# Patient Record
Sex: FEMALE | Race: ASIAN | NOT HISPANIC OR LATINO
[De-identification: names, ages, dates, MRNs, and addresses within clinical notes are randomized per-mention and may not be internally consistent; named-entity substitution may affect disease eponyms.]

---

## 2017-07-12 ENCOUNTER — APPOINTMENT (OUTPATIENT)
Dept: UROLOGY | Facility: CLINIC | Age: 77
End: 2017-07-12

## 2017-07-14 ENCOUNTER — APPOINTMENT (OUTPATIENT)
Dept: UROLOGY | Facility: CLINIC | Age: 77
End: 2017-07-14

## 2017-07-19 ENCOUNTER — APPOINTMENT (OUTPATIENT)
Dept: UROLOGY | Facility: CLINIC | Age: 77
End: 2017-07-19

## 2018-03-03 ENCOUNTER — EMERGENCY (EMERGENCY)
Facility: HOSPITAL | Age: 78
LOS: 1 days | Discharge: HOME | End: 2018-03-03
Attending: EMERGENCY MEDICINE | Admitting: EMERGENCY MEDICINE
Payer: MEDICARE

## 2018-03-03 VITALS
DIASTOLIC BLOOD PRESSURE: 86 MMHG | WEIGHT: 134.92 LBS | SYSTOLIC BLOOD PRESSURE: 184 MMHG | HEIGHT: 63 IN | OXYGEN SATURATION: 98 % | RESPIRATION RATE: 18 BRPM | HEART RATE: 88 BPM | TEMPERATURE: 99 F

## 2018-03-03 PROCEDURE — 99283 EMERGENCY DEPT VISIT LOW MDM: CPT | Mod: 25,GC

## 2018-03-03 NOTE — ED ADULT TRIAGE NOTE - CHIEF COMPLAINT QUOTE
right knee pain    right knee pain for a couple of weeks.... saw pmd and told had arthritis and fluid on the knee... meds helping initially but pain worsened yesterday. has pain to back of knee.

## 2018-03-04 PROCEDURE — 73562 X-RAY EXAM OF KNEE 3: CPT | Mod: 26,RT

## 2018-03-04 RX ORDER — ACETAMINOPHEN 500 MG
954 TABLET ORAL ONCE
Qty: 0 | Refills: 0 | Status: DISCONTINUED | OUTPATIENT
Start: 2018-03-04 | End: 2018-03-04

## 2018-03-04 RX ORDER — FAMOTIDINE 10 MG/ML
20 INJECTION INTRAVENOUS ONCE
Qty: 0 | Refills: 0 | Status: COMPLETED | OUTPATIENT
Start: 2018-03-04 | End: 2018-03-04

## 2018-03-04 RX ORDER — IBUPROFEN 200 MG
600 TABLET ORAL ONCE
Qty: 0 | Refills: 0 | Status: COMPLETED | OUTPATIENT
Start: 2018-03-04 | End: 2018-03-04

## 2018-03-04 RX ORDER — ACETAMINOPHEN 500 MG
975 TABLET ORAL ONCE
Qty: 0 | Refills: 0 | Status: COMPLETED | OUTPATIENT
Start: 2018-03-04 | End: 2018-03-04

## 2018-03-04 RX ADMIN — Medication 975 MILLIGRAM(S): at 02:59

## 2018-03-04 RX ADMIN — Medication 600 MILLIGRAM(S): at 02:59

## 2018-03-04 RX ADMIN — FAMOTIDINE 20 MILLIGRAM(S): 10 INJECTION INTRAVENOUS at 02:59

## 2018-03-04 NOTE — ED PROVIDER NOTE - CARE PLAN
Principal Discharge DX:	Chronic pain of right knee Principal Discharge DX:	Chronic pain of right knee  Assessment and plan of treatment:	You were seen in the ER for right knee pain. You must follow up with an orthopedic specialist this week. Return to the ER for any new or worsening signs/symptoms.   1) Take Tylenol (acetaminophen) 1000 mg every 6 to 8 hours as needed for pain with a daily maximal dose of 3000 mg.  2) Take ibuprofen, 600 mg,  every 6 to 8 hours as needed for pain with a maximum daily dose of 1800 mg.  3) You may take 20mg of Pepcid twice per day for indigestion relief. This is an over the counter medication available at normal pharmacies.

## 2018-03-04 NOTE — ED PROVIDER NOTE - MEDICAL DECISION MAKING DETAILS
Fab: 77 F, ? arthritis R knee, no trauma/F. R knee mildly swollen; not red/hot. Has FROM. AFVSS. Moderate HTN. Will get weight-bearing imaging. Pain control. Eval for gout. Fab: 77 F, OA R knee, no trauma/F. Walked more than usual. P/w R knee pain. R knee mildly swollen; not red/hot. Limited FROM. AFVSS. Moderate HTN. Likely meniscal tear after OA and exercise. Pain control. Ortho f/u.

## 2018-03-04 NOTE — ED ADULT NURSE NOTE - OBJECTIVE STATEMENT
Break coverage RN received pt to spot 20 A&Ox4 family at bedside c/o worsening arthritic pain in right knee, newly dx approx 4 weeks ago given meloxicam with relief but today pain became unbearable. Not able to weight bear at this time, denies other PMH denies other medical complaints, NAD noted, will continue to monitor.

## 2018-03-04 NOTE — ED PROVIDER NOTE - OBJECTIVE STATEMENT
77 female history of right knee OA here for right knee pain. Onset today after increased PA (walking up hills, walked more then usual), then knee locked, unable to place weight on it. No fevers/chills, no increase swelling. Pain localized to knee, nonradiating, severe when steps on it, better when not walking. Has never been this severe.

## 2018-03-04 NOTE — ED PROVIDER NOTE - NS ED ROS FT
GENERAL: No fever or chills, //             EYES: no change in vision, //             HEENT: no trouble swallowing or speaking, //             CARDIAC: no chest pain, //              PULMONARY: no cough or SOB, //             GI: no abdominal pain, no nausea or no vomiting, no diarrhea or constipation, //             : No changes in urination,  //            SKIN: no rashes,  //            NEURO: no headache,  //         otherwise as HPI or negative. ~Ligia Pan M.D., Ph.D. -Resident

## 2018-03-04 NOTE — ED PROVIDER NOTE - PLAN OF CARE
You were seen in the ER for right knee pain. You must follow up with an orthopedic specialist this week. Return to the ER for any new or worsening signs/symptoms.   1) Take Tylenol (acetaminophen) 1000 mg every 6 to 8 hours as needed for pain with a daily maximal dose of 3000 mg.  2) Take ibuprofen, 600 mg,  every 6 to 8 hours as needed for pain with a maximum daily dose of 1800 mg.  3) You may take 20mg of Pepcid twice per day for indigestion relief. This is an over the counter medication available at normal pharmacies.

## 2018-03-04 NOTE — ED PROVIDER NOTE - PHYSICAL EXAMINATION
Gen: NAD, AOx3, non-toxic //            Head: NCAT //            HEENT: EOMI, oral mucosa moist, normal conjunctiva //            Lung: CTAB, no respiratory distress, no wheezes/rhonchi/rales B/L, speaking in full sentences. //            CV: RRR, no murmurs, rubs or gallops //            Abd: soft, NTND, no guarding, no CVA tenderness //            MSK: right knee tenderness to palpation, ROM limited by pain, unable to ambulate with full weight on right leg. No overlying erythema, mild effusion. No warmth. Otherwise MSK exam WINL.  //            Neuro: No focal sensory or motor deficits //            Skin: Warm, well perfused, no rash //            Psych: normal affect. ~Ligia Pan M.D., Ph.D. -Resident

## 2018-03-04 NOTE — ED PROVIDER NOTE - ATTENDING CONTRIBUTION TO CARE
I performed a face-to-face evaluation of the patient and performed a history and physical examination. I agree with the history and physical examination.    Fab: 77 F, ? arthritis R knee, no trauma/F. R knee mildly swollen; not red/hot. Has FROM. AFVSS. Moderate HTN. Will get weight-bearing imaging. Pain control. Eval for gout. I performed a face-to-face evaluation of the patient and performed a history and physical examination. I agree with the history and physical examination.    Fab: 77 F, OA R knee, no trauma/F. Walked more than usual. P/w R knee pain. R knee mildly swollen; not red/hot. Limited FROM. AFVSS. Moderate HTN. Likely meniscal tear after OA and exercise. Pain control. Ortho f/u.

## 2018-03-06 ENCOUNTER — APPOINTMENT (OUTPATIENT)
Dept: ORTHOPEDIC SURGERY | Facility: CLINIC | Age: 78
End: 2018-03-06
Payer: MEDICARE

## 2018-03-06 VITALS — HEART RATE: 80 BPM | DIASTOLIC BLOOD PRESSURE: 73 MMHG | SYSTOLIC BLOOD PRESSURE: 180 MMHG

## 2018-03-06 VITALS — WEIGHT: 135 LBS | BODY MASS INDEX: 23.92 KG/M2 | HEIGHT: 63 IN

## 2018-03-06 DIAGNOSIS — Z86.39 PERSONAL HISTORY OF OTHER ENDOCRINE, NUTRITIONAL AND METABOLIC DISEASE: ICD-10-CM

## 2018-03-06 DIAGNOSIS — Z78.9 OTHER SPECIFIED HEALTH STATUS: ICD-10-CM

## 2018-03-06 PROCEDURE — 99204 OFFICE O/P NEW MOD 45 MIN: CPT

## 2018-03-15 ENCOUNTER — APPOINTMENT (OUTPATIENT)
Dept: ORTHOPEDIC SURGERY | Facility: CLINIC | Age: 78
End: 2018-03-15
Payer: MEDICARE

## 2018-03-15 VITALS
WEIGHT: 135 LBS | BODY MASS INDEX: 23.92 KG/M2 | SYSTOLIC BLOOD PRESSURE: 214 MMHG | HEIGHT: 63 IN | DIASTOLIC BLOOD PRESSURE: 78 MMHG | HEART RATE: 92 BPM

## 2018-03-15 PROCEDURE — 99213 OFFICE O/P EST LOW 20 MIN: CPT | Mod: 25

## 2018-03-15 PROCEDURE — 20610 DRAIN/INJ JOINT/BURSA W/O US: CPT | Mod: RT

## 2018-03-22 ENCOUNTER — RX RENEWAL (OUTPATIENT)
Age: 78
End: 2018-03-22

## 2018-03-29 ENCOUNTER — APPOINTMENT (OUTPATIENT)
Dept: ORTHOPEDIC SURGERY | Facility: CLINIC | Age: 78
End: 2018-03-29
Payer: MEDICARE

## 2018-03-29 VITALS
HEIGHT: 63 IN | BODY MASS INDEX: 23.92 KG/M2 | WEIGHT: 135 LBS | SYSTOLIC BLOOD PRESSURE: 192 MMHG | HEART RATE: 86 BPM | DIASTOLIC BLOOD PRESSURE: 75 MMHG

## 2018-03-29 PROCEDURE — 99213 OFFICE O/P EST LOW 20 MIN: CPT

## 2018-03-29 PROCEDURE — 73562 X-RAY EXAM OF KNEE 3: CPT | Mod: RT

## 2018-04-02 ENCOUNTER — APPOINTMENT (OUTPATIENT)
Dept: ORTHOPEDIC SURGERY | Facility: CLINIC | Age: 78
End: 2018-04-02
Payer: MEDICARE

## 2018-04-02 VITALS
WEIGHT: 135 LBS | HEART RATE: 73 BPM | HEIGHT: 63 IN | DIASTOLIC BLOOD PRESSURE: 71 MMHG | SYSTOLIC BLOOD PRESSURE: 196 MMHG | BODY MASS INDEX: 23.92 KG/M2

## 2018-04-02 PROCEDURE — 20610 DRAIN/INJ JOINT/BURSA W/O US: CPT | Mod: RT

## 2018-04-09 ENCOUNTER — APPOINTMENT (OUTPATIENT)
Dept: ORTHOPEDIC SURGERY | Facility: CLINIC | Age: 78
End: 2018-04-09
Payer: MEDICARE

## 2018-04-09 VITALS
HEART RATE: 96 BPM | DIASTOLIC BLOOD PRESSURE: 75 MMHG | SYSTOLIC BLOOD PRESSURE: 172 MMHG | BODY MASS INDEX: 23.92 KG/M2 | HEIGHT: 63 IN | WEIGHT: 135 LBS

## 2018-04-09 PROCEDURE — 20610 DRAIN/INJ JOINT/BURSA W/O US: CPT | Mod: RT

## 2018-04-16 ENCOUNTER — APPOINTMENT (OUTPATIENT)
Dept: ORTHOPEDIC SURGERY | Facility: CLINIC | Age: 78
End: 2018-04-16
Payer: MEDICARE

## 2018-04-16 VITALS
HEART RATE: 88 BPM | DIASTOLIC BLOOD PRESSURE: 73 MMHG | SYSTOLIC BLOOD PRESSURE: 147 MMHG | BODY MASS INDEX: 51.91 KG/M2 | HEIGHT: 63 IN | WEIGHT: 293 LBS

## 2018-04-16 PROCEDURE — 20610 DRAIN/INJ JOINT/BURSA W/O US: CPT | Mod: RT

## 2018-05-15 ENCOUNTER — APPOINTMENT (OUTPATIENT)
Dept: ORTHOPEDIC SURGERY | Facility: CLINIC | Age: 78
End: 2018-05-15

## 2018-05-22 ENCOUNTER — APPOINTMENT (OUTPATIENT)
Dept: ORTHOPEDIC SURGERY | Facility: CLINIC | Age: 78
End: 2018-05-22
Payer: MEDICARE

## 2018-05-22 VITALS
HEART RATE: 87 BPM | SYSTOLIC BLOOD PRESSURE: 184 MMHG | DIASTOLIC BLOOD PRESSURE: 71 MMHG | BODY MASS INDEX: 23.92 KG/M2 | HEIGHT: 63 IN | WEIGHT: 135 LBS

## 2018-05-22 PROCEDURE — 20610 DRAIN/INJ JOINT/BURSA W/O US: CPT | Mod: RT

## 2018-05-22 PROCEDURE — 99213 OFFICE O/P EST LOW 20 MIN: CPT | Mod: 25

## 2018-07-05 ENCOUNTER — APPOINTMENT (OUTPATIENT)
Dept: ORTHOPEDIC SURGERY | Facility: CLINIC | Age: 78
End: 2018-07-05
Payer: MEDICARE

## 2018-07-05 VITALS
HEIGHT: 63 IN | WEIGHT: 135 LBS | HEART RATE: 87 BPM | SYSTOLIC BLOOD PRESSURE: 147 MMHG | BODY MASS INDEX: 23.92 KG/M2 | DIASTOLIC BLOOD PRESSURE: 61 MMHG

## 2018-07-05 PROCEDURE — 73564 X-RAY EXAM KNEE 4 OR MORE: CPT | Mod: LT

## 2018-07-05 PROCEDURE — 99213 OFFICE O/P EST LOW 20 MIN: CPT

## 2018-07-24 ENCOUNTER — APPOINTMENT (OUTPATIENT)
Dept: ORTHOPEDIC SURGERY | Facility: CLINIC | Age: 78
End: 2018-07-24
Payer: MEDICARE

## 2018-07-24 VITALS
WEIGHT: 135 LBS | DIASTOLIC BLOOD PRESSURE: 67 MMHG | HEART RATE: 79 BPM | HEIGHT: 63 IN | SYSTOLIC BLOOD PRESSURE: 168 MMHG | BODY MASS INDEX: 23.92 KG/M2

## 2018-07-24 PROCEDURE — 99213 OFFICE O/P EST LOW 20 MIN: CPT

## 2018-08-09 ENCOUNTER — APPOINTMENT (OUTPATIENT)
Dept: ORTHOPEDIC SURGERY | Facility: CLINIC | Age: 78
End: 2018-08-09
Payer: MEDICARE

## 2018-08-09 VITALS
SYSTOLIC BLOOD PRESSURE: 143 MMHG | HEIGHT: 63 IN | HEART RATE: 76 BPM | DIASTOLIC BLOOD PRESSURE: 63 MMHG | WEIGHT: 135 LBS | BODY MASS INDEX: 23.92 KG/M2

## 2018-08-09 PROCEDURE — 99213 OFFICE O/P EST LOW 20 MIN: CPT

## 2018-08-09 PROCEDURE — 73610 X-RAY EXAM OF ANKLE: CPT | Mod: LT

## 2018-09-04 ENCOUNTER — APPOINTMENT (OUTPATIENT)
Dept: ORTHOPEDIC SURGERY | Facility: CLINIC | Age: 78
End: 2018-09-04
Payer: MEDICARE

## 2018-09-04 VITALS
SYSTOLIC BLOOD PRESSURE: 111 MMHG | HEIGHT: 63 IN | WEIGHT: 136 LBS | DIASTOLIC BLOOD PRESSURE: 67 MMHG | HEART RATE: 87 BPM | BODY MASS INDEX: 24.1 KG/M2

## 2018-09-04 DIAGNOSIS — M25.472 EFFUSION, LEFT ANKLE: ICD-10-CM

## 2018-09-04 PROCEDURE — 99213 OFFICE O/P EST LOW 20 MIN: CPT

## 2018-11-20 ENCOUNTER — APPOINTMENT (OUTPATIENT)
Dept: INTERNAL MEDICINE | Facility: CLINIC | Age: 78
End: 2018-11-20
Payer: MEDICARE

## 2018-11-20 ENCOUNTER — LABORATORY RESULT (OUTPATIENT)
Age: 78
End: 2018-11-20

## 2018-11-20 VITALS — DIASTOLIC BLOOD PRESSURE: 76 MMHG | SYSTOLIC BLOOD PRESSURE: 150 MMHG

## 2018-11-20 VITALS
BODY MASS INDEX: 24.8 KG/M2 | WEIGHT: 140 LBS | DIASTOLIC BLOOD PRESSURE: 60 MMHG | SYSTOLIC BLOOD PRESSURE: 130 MMHG | HEIGHT: 63 IN

## 2018-11-20 DIAGNOSIS — Z82.49 FAMILY HISTORY OF ISCHEMIC HEART DISEASE AND OTHER DISEASES OF THE CIRCULATORY SYSTEM: ICD-10-CM

## 2018-11-20 PROCEDURE — 36415 COLL VENOUS BLD VENIPUNCTURE: CPT

## 2018-11-20 PROCEDURE — 99203 OFFICE O/P NEW LOW 30 MIN: CPT | Mod: 25

## 2018-11-20 RX ORDER — TRAMADOL HYDROCHLORIDE 25 MG/1
TABLET, COATED ORAL
Refills: 0 | Status: DISCONTINUED | COMMUNITY
End: 2018-11-20

## 2018-11-20 RX ORDER — MELOXICAM 15 MG/1
15 TABLET ORAL
Qty: 30 | Refills: 0 | Status: DISCONTINUED | COMMUNITY
Start: 2018-01-16 | End: 2018-11-20

## 2018-11-20 RX ORDER — LIDOCAINE 5 G/100G
5 OINTMENT TOPICAL
Qty: 3544 | Refills: 0 | Status: DISCONTINUED | COMMUNITY
Start: 2018-01-16 | End: 2018-11-20

## 2018-11-20 RX ORDER — IBUPROFEN 200 MG/1
TABLET, FILM COATED ORAL
Refills: 0 | Status: DISCONTINUED | COMMUNITY
End: 2018-11-20

## 2018-11-20 RX ORDER — ACETAMINOPHEN 325 MG/1
TABLET, FILM COATED ORAL
Refills: 0 | Status: ACTIVE | COMMUNITY

## 2018-11-20 RX ORDER — MELOXICAM 15 MG/1
15 TABLET ORAL DAILY
Qty: 30 | Refills: 0 | Status: DISCONTINUED | COMMUNITY
Start: 2018-03-22 | End: 2018-11-20

## 2018-11-20 NOTE — COUNSELING
[Healthy eating counseling provided] : healthy eating [Activity counseling provided] : activity [Low Fat Diet] : Low fat diet [Low Salt Diet] : Low salt diet

## 2018-11-20 NOTE — HISTORY OF PRESENT ILLNESS
[FreeTextEntry8] : CC:Htn\par \par HTN- compliant w meds, diet.  no CP or SOB.  pt states home /70"s\par \par CHol- compliant w meds, diet.  no abd pain, myalgia\par pt had flu vaccine.  pt thinks she had pneum vaccine.  pt states she didn't  get shingrix vaccine- due to cost\par knee pain- getting shots by Ortho- takes tylenol for pain.  pt goes to the [facility].  pt getting jt injections- now improved jt pain\par \par pt was seen GYN- last mo and has appt for mammo\par pt had colonoscopy\par DEXA- goes every 2 yr.\par last seen ophth every few mo- glaucoma

## 2018-11-20 NOTE — PLAN
[FreeTextEntry1] : rec shingrix vaccine\par HTN- elev BP- pt will ck home BP.  low salt diet\par pt to obtain records of vaccine, mammo, colonoscopy and ck last CPE

## 2018-11-21 LAB
ALBUMIN SERPL ELPH-MCNC: 4.8 G/DL
ALP BLD-CCNC: 53 U/L
ALT SERPL-CCNC: 11 U/L
ANION GAP SERPL CALC-SCNC: 14 MMOL/L
AST SERPL-CCNC: 18 U/L
BASOPHILS # BLD AUTO: 0.04 K/UL
BASOPHILS NFR BLD AUTO: 0.6 %
BILIRUB SERPL-MCNC: 0.7 MG/DL
BUN SERPL-MCNC: 21 MG/DL
CALCIUM SERPL-MCNC: 9.6 MG/DL
CHLORIDE SERPL-SCNC: 109 MMOL/L
CHOLEST SERPL-MCNC: 161 MG/DL
CHOLEST/HDLC SERPL: 4.1 RATIO
CO2 SERPL-SCNC: 24 MMOL/L
CREAT SERPL-MCNC: 0.81 MG/DL
EOSINOPHIL # BLD AUTO: 0.29 K/UL
EOSINOPHIL NFR BLD AUTO: 4.3 %
GLUCOSE SERPL-MCNC: 86 MG/DL
HBA1C MFR BLD HPLC: 5.2 %
HCT VFR BLD CALC: 36.5 %
HDLC SERPL-MCNC: 39 MG/DL
HGB BLD-MCNC: 11.5 G/DL
IMM GRANULOCYTES NFR BLD AUTO: 0.1 %
LDLC SERPL CALC-MCNC: 90 MG/DL
LDLC SERPL DIRECT ASSAY-MCNC: 98 MG/DL
LYMPHOCYTES # BLD AUTO: 2.19 K/UL
LYMPHOCYTES NFR BLD AUTO: 32.3 %
MAN DIFF?: NORMAL
MCHC RBC-ENTMCNC: 28.2 PG
MCHC RBC-ENTMCNC: 31.5 GM/DL
MCV RBC AUTO: 89.5 FL
MONOCYTES # BLD AUTO: 0.51 K/UL
MONOCYTES NFR BLD AUTO: 7.5 %
NEUTROPHILS # BLD AUTO: 3.75 K/UL
NEUTROPHILS NFR BLD AUTO: 55.2 %
PLATELET # BLD AUTO: 254 K/UL
POTASSIUM SERPL-SCNC: 3.8 MMOL/L
PROT SERPL-MCNC: 7.8 G/DL
RBC # BLD: 4.08 M/UL
RBC # FLD: 13 %
SAVE SPECIMEN: NORMAL
SODIUM SERPL-SCNC: 147 MMOL/L
TRIGL SERPL-MCNC: 161 MG/DL
TSH SERPL-ACNC: 2.18 UIU/ML
WBC # FLD AUTO: 6.79 K/UL

## 2018-11-28 ENCOUNTER — RX RENEWAL (OUTPATIENT)
Age: 78
End: 2018-11-28

## 2018-12-04 ENCOUNTER — APPOINTMENT (OUTPATIENT)
Dept: ORTHOPEDIC SURGERY | Facility: CLINIC | Age: 78
End: 2018-12-04
Payer: MEDICARE

## 2018-12-04 VITALS
SYSTOLIC BLOOD PRESSURE: 147 MMHG | WEIGHT: 140 LBS | HEIGHT: 63 IN | HEART RATE: 75 BPM | BODY MASS INDEX: 24.8 KG/M2 | DIASTOLIC BLOOD PRESSURE: 66 MMHG

## 2018-12-04 DIAGNOSIS — M17.11 UNILATERAL PRIMARY OSTEOARTHRITIS, RIGHT KNEE: ICD-10-CM

## 2018-12-04 PROCEDURE — 99213 OFFICE O/P EST LOW 20 MIN: CPT

## 2019-01-08 ENCOUNTER — RX RENEWAL (OUTPATIENT)
Age: 79
End: 2019-01-08

## 2019-02-28 ENCOUNTER — RX RENEWAL (OUTPATIENT)
Age: 79
End: 2019-02-28

## 2019-03-19 ENCOUNTER — LABORATORY RESULT (OUTPATIENT)
Age: 79
End: 2019-03-19

## 2019-03-19 ENCOUNTER — APPOINTMENT (OUTPATIENT)
Dept: INTERNAL MEDICINE | Facility: CLINIC | Age: 79
End: 2019-03-19
Payer: MEDICARE

## 2019-03-19 VITALS
HEIGHT: 63 IN | HEART RATE: 80 BPM | DIASTOLIC BLOOD PRESSURE: 74 MMHG | OXYGEN SATURATION: 98 % | TEMPERATURE: 97.2 F | RESPIRATION RATE: 16 BRPM | BODY MASS INDEX: 24.1 KG/M2 | SYSTOLIC BLOOD PRESSURE: 132 MMHG | WEIGHT: 136 LBS

## 2019-03-19 DIAGNOSIS — Z80.41 FAMILY HISTORY OF MALIGNANT NEOPLASM OF OVARY: ICD-10-CM

## 2019-03-19 DIAGNOSIS — Z80.1 FAMILY HISTORY OF MALIGNANT NEOPLASM OF TRACHEA, BRONCHUS AND LUNG: ICD-10-CM

## 2019-03-19 DIAGNOSIS — H40.9 UNSPECIFIED GLAUCOMA: ICD-10-CM

## 2019-03-19 PROCEDURE — 93000 ELECTROCARDIOGRAM COMPLETE: CPT

## 2019-03-19 PROCEDURE — G0439: CPT | Mod: 25

## 2019-03-19 PROCEDURE — 94010 BREATHING CAPACITY TEST: CPT

## 2019-03-19 PROCEDURE — 71046 X-RAY EXAM CHEST 2 VIEWS: CPT

## 2019-03-19 PROCEDURE — 99205 OFFICE O/P NEW HI 60 MIN: CPT | Mod: 25

## 2019-03-19 PROCEDURE — 36415 COLL VENOUS BLD VENIPUNCTURE: CPT

## 2019-03-19 NOTE — ASSESSMENT
[FreeTextEntry1] : Hypertension is well controlled.\par \par Blood work and urinalysis results are pending. Same Rx for now.\par \par Recheck blood pressure in 4 months.

## 2019-03-19 NOTE — HEALTH RISK ASSESSMENT
[Excellent] : ~his/her~ current health as excellent [Patient reported mammogram was normal] : Patient reported mammogram was normal [Patient reported colonoscopy was normal] : Patient reported colonoscopy was normal [HIV test declined] : HIV test declined [Hepatitis C test offered] : Hepatitis C test offered [None] : None [Alone] : lives alone [Employed] : employed [] :  [Feels Safe at Home] : Feels safe at home [Fully functional (bathing, dressing, toileting, transferring, walking, feeding)] : Fully functional (bathing, dressing, toileting, transferring, walking, feeding) [Fully functional (using the telephone, shopping, preparing meals, housekeeping, doing laundry, using] : Fully functional and needs no help or supervision to perform IADLs (using the telephone, shopping, preparing meals, housekeeping, doing laundry, using transportation, managing medications and managing finances) [Reports normal functional visual acuity (ie: able to read med bottle)] : Reports normal functional visual acuity [Smoke Detector] : smoke detector [Carbon Monoxide Detector] : carbon monoxide detector [Seat Belt] :  uses seat belt [Sunscreen] : uses sunscreen [FreeTextEntry1] : Breast implants, glaucoma, osteoarthritis of the right knee [] : No [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, ophthalmology [de-identified] : Limited, but active at work [de-identified] : Good [Change in mental status noted] : No change in mental status noted [Language] : denies difficulty with language [Behavior] : denies difficulty with behavior [Learning/Retaining New Information] : denies difficulty learning/retaining new information [Handling Complex Tasks] : denies difficulty handling complex tasks [Reasoning] : denies difficulty with reasoning [Spatial Ability and Orientation] : denies difficulty with spatial ability and orientation [High Risk Behavior] : no high risk behavior [Sexually Active] : not sexually active [Reports changes in dental health] : Reports no changes in dental health [Reports changes in vision] : Reports no changes in vision [Travel to Developing Areas] : does not  travel to developing areas [Guns at Home] : no guns at home [TB Exposure] : is not being exposed to tuberculosis [MammogramDate] : 3/19 [Caregiver Concerns] : does not have caregiver concerns [HepatitisCComments] : In progress [ColonoscopyDate] : 9/12 [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

## 2019-03-19 NOTE — DATA REVIEWED
[FreeTextEntry1] : Electrocardiogram reveals a sinus rhythm with first degree AV block. Spirometry is normal. Pulse oximetry is 98%.\par \par Chest x-ray reveals clear lung fields, normal heart size and a normal bony thorax for age-no active pulmonary disease.\par \par Blood work and urinalysis results are pending.

## 2019-03-19 NOTE — HISTORY OF PRESENT ILLNESS
[FreeTextEntry1] : Complete annual examination [de-identified] : Feels well. She is very anxious about bilateral saline implants which have been present and not problematic for more than a decade.

## 2019-04-05 ENCOUNTER — APPOINTMENT (OUTPATIENT)
Dept: INTERNAL MEDICINE | Facility: CLINIC | Age: 79
End: 2019-04-05
Payer: MEDICARE

## 2019-04-05 VITALS
WEIGHT: 137 LBS | HEART RATE: 87 BPM | SYSTOLIC BLOOD PRESSURE: 135 MMHG | HEIGHT: 63 IN | OXYGEN SATURATION: 98 % | BODY MASS INDEX: 24.27 KG/M2 | DIASTOLIC BLOOD PRESSURE: 80 MMHG | TEMPERATURE: 97.9 F

## 2019-04-05 DIAGNOSIS — H57.89 OTHER SPECIFIED DISORDERS OF EYE AND ADNEXA: ICD-10-CM

## 2019-04-05 DIAGNOSIS — H10.11 ACUTE ATOPIC CONJUNCTIVITIS, RIGHT EYE: ICD-10-CM

## 2019-04-05 LAB
ALBUMIN SERPL ELPH-MCNC: 4.5 G/DL
ALP BLD-CCNC: 57 U/L
ALT SERPL-CCNC: 13 U/L
ANION GAP SERPL CALC-SCNC: 17 MMOL/L
APPEARANCE: ABNORMAL
AST SERPL-CCNC: 18 U/L
BASOPHILS # BLD AUTO: 0.06 K/UL
BASOPHILS NFR BLD AUTO: 0.7 %
BILIRUB SERPL-MCNC: 0.7 MG/DL
BILIRUBIN URINE: NEGATIVE
BLOOD URINE: NEGATIVE
BUN SERPL-MCNC: 25 MG/DL
CALCIUM SERPL-MCNC: 10 MG/DL
CHLORIDE SERPL-SCNC: 102 MMOL/L
CHOLEST SERPL-MCNC: 171 MG/DL
CHOLEST/HDLC SERPL: 4.3 RATIO
CO2 SERPL-SCNC: 24 MMOL/L
COLOR: NORMAL
CREAT SERPL-MCNC: 0.84 MG/DL
EOSINOPHIL # BLD AUTO: 0.23 K/UL
EOSINOPHIL NFR BLD AUTO: 2.8 %
GLUCOSE QUALITATIVE U: NEGATIVE
GLUCOSE SERPL-MCNC: 111 MG/DL
HBA1C MFR BLD HPLC: 5.1 %
HCT VFR BLD CALC: 42.3 %
HCV RNA FLD QL NAA+PROBE: NORMAL
HCV RNA SPEC QL PROBE+SIG AMP: NOT DETECTED
HDLC SERPL-MCNC: 40 MG/DL
HGB BLD-MCNC: 12.9 G/DL
IMM GRANULOCYTES NFR BLD AUTO: 0.2 %
KETONES URINE: NEGATIVE
LDLC SERPL CALC-MCNC: 96 MG/DL
LEUKOCYTE ESTERASE URINE: NEGATIVE
LYMPHOCYTES # BLD AUTO: 1.41 K/UL
LYMPHOCYTES NFR BLD AUTO: 17 %
MAN DIFF?: NORMAL
MCHC RBC-ENTMCNC: 29.2 PG
MCHC RBC-ENTMCNC: 30.5 GM/DL
MCV RBC AUTO: 95.7 FL
MONOCYTES # BLD AUTO: 0.44 K/UL
MONOCYTES NFR BLD AUTO: 5.3 %
NEUTROPHILS # BLD AUTO: 6.14 K/UL
NEUTROPHILS NFR BLD AUTO: 74 %
NITRITE URINE: NEGATIVE
PH URINE: 6
PLATELET # BLD AUTO: 255 K/UL
POTASSIUM SERPL-SCNC: 3.8 MMOL/L
PROT SERPL-MCNC: 7.7 G/DL
PROTEIN URINE: NEGATIVE
RBC # BLD: 4.42 M/UL
RBC # FLD: 12.5 %
SODIUM SERPL-SCNC: 142 MMOL/L
SPECIFIC GRAVITY URINE: 1.02
T3 SERPL-MCNC: 92 NG/DL
T4 SERPL-MCNC: 5.7 UG/DL
TRIGL SERPL-MCNC: 175 MG/DL
TSH SERPL-ACNC: 2.32 UIU/ML
UROBILINOGEN URINE: NORMAL
WBC # FLD AUTO: 8.3 K/UL

## 2019-04-05 PROCEDURE — 99214 OFFICE O/P EST MOD 30 MIN: CPT

## 2019-04-18 ENCOUNTER — RX RENEWAL (OUTPATIENT)
Age: 79
End: 2019-04-18

## 2019-05-23 ENCOUNTER — APPOINTMENT (OUTPATIENT)
Dept: INTERNAL MEDICINE | Facility: CLINIC | Age: 79
End: 2019-05-23
Payer: MEDICARE

## 2019-05-23 VITALS
SYSTOLIC BLOOD PRESSURE: 138 MMHG | WEIGHT: 139 LBS | HEIGHT: 63 IN | DIASTOLIC BLOOD PRESSURE: 72 MMHG | OXYGEN SATURATION: 97 % | HEART RATE: 92 BPM | TEMPERATURE: 99 F | BODY MASS INDEX: 24.63 KG/M2

## 2019-05-23 DIAGNOSIS — Z87.09 PERSONAL HISTORY OF OTHER DISEASES OF THE RESPIRATORY SYSTEM: ICD-10-CM

## 2019-05-23 PROCEDURE — 99213 OFFICE O/P EST LOW 20 MIN: CPT | Mod: 25

## 2019-05-23 PROCEDURE — 87880 STREP A ASSAY W/OPTIC: CPT | Mod: QW

## 2019-05-30 LAB — BACTERIA THROAT CULT: NORMAL

## 2019-06-11 ENCOUNTER — MEDICATION RENEWAL (OUTPATIENT)
Age: 79
End: 2019-06-11

## 2019-06-13 ENCOUNTER — APPOINTMENT (OUTPATIENT)
Dept: ORTHOPEDIC SURGERY | Facility: CLINIC | Age: 79
End: 2019-06-13
Payer: MEDICARE

## 2019-06-13 VITALS
WEIGHT: 139 LBS | SYSTOLIC BLOOD PRESSURE: 150 MMHG | HEIGHT: 63 IN | BODY MASS INDEX: 24.63 KG/M2 | HEART RATE: 75 BPM | DIASTOLIC BLOOD PRESSURE: 67 MMHG

## 2019-06-13 DIAGNOSIS — L03.116 CELLULITIS OF LEFT LOWER LIMB: ICD-10-CM

## 2019-06-13 PROCEDURE — 73564 X-RAY EXAM KNEE 4 OR MORE: CPT | Mod: LT

## 2019-06-13 PROCEDURE — 99214 OFFICE O/P EST MOD 30 MIN: CPT

## 2019-06-13 NOTE — HISTORY OF PRESENT ILLNESS
[de-identified] : Ms. WING BURNETT  is a 78 year old female who presents to the office for a follow-up visit regardig left knee pain.  She has had pain for the past 2 weeks and yesterday her pain was intense and she noticed some redness inferior to the patella.  Denies any recent fever, chills, sweats, or recent infection.   She did PT for her right knee.

## 2019-06-13 NOTE — DISCUSSION/SUMMARY
[de-identified] : The patient has left knee pain secondary to osteoarthritis. She has an area of cellulitis on the anterior aspect of her knee. There is no indication of a deep infection. There is a break in the skin just distal to the area of cellulitis. Patient is started on a one-week course of antibiotics and will return at that time. She will be seen sooner if the condition worsens.

## 2019-06-13 NOTE — PHYSICAL EXAM
[LE] : Sensory: Intact in bilateral lower extremities [Knee] : patellar 2+ and symmetric bilaterally [Ankle] : ankle 2+ and symmetric bilaterally [Plant] : plantar 2+ and symmetric bilaterally [DP] : dorsalis pedis 2+ and symmetric bilaterally [PT] : posterior tibial 2+ and symmetric bilaterally [Normal] : Alert and in no acute distress [Poor Appearance] : well-appearing [Acute Distress] : not in acute distress [Obese] : not obese [de-identified] : The patient has no respiratory distress. Mood and affect are normal. The patient is alert and oriented to person, place and time.\par The patient has no pain with motion of the hips. Examination of the left knee demonstrates an area of cellulitis at the tibial tubercle. Distal to the area of cellulitis there is a superficial laceration. There is no connection between the laceration and the area of cellulitis. The knee is stable. Range of motion 0-115°. There is mild pain with left knee range of motion. The calves are soft and nontender. The skin is intact. There is no lymphedema. [de-identified] : AP, lateral, tunnel and sunrise x-rays of the left knee demonstrate no fracture or dislocation. There are degenerative changes.

## 2019-06-25 ENCOUNTER — APPOINTMENT (OUTPATIENT)
Dept: ORTHOPEDIC SURGERY | Facility: CLINIC | Age: 79
End: 2019-06-25
Payer: MEDICARE

## 2019-06-25 VITALS
WEIGHT: 138 LBS | SYSTOLIC BLOOD PRESSURE: 121 MMHG | BODY MASS INDEX: 24.45 KG/M2 | DIASTOLIC BLOOD PRESSURE: 56 MMHG | HEIGHT: 63 IN | HEART RATE: 73 BPM

## 2019-06-25 PROCEDURE — 99213 OFFICE O/P EST LOW 20 MIN: CPT

## 2019-08-29 ENCOUNTER — APPOINTMENT (OUTPATIENT)
Dept: INTERNAL MEDICINE | Facility: CLINIC | Age: 79
End: 2019-08-29
Payer: MEDICARE

## 2019-08-29 VITALS
TEMPERATURE: 97.7 F | OXYGEN SATURATION: 98 % | SYSTOLIC BLOOD PRESSURE: 126 MMHG | WEIGHT: 135 LBS | RESPIRATION RATE: 16 BRPM | DIASTOLIC BLOOD PRESSURE: 66 MMHG | HEART RATE: 83 BPM | HEIGHT: 63 IN | BODY MASS INDEX: 23.92 KG/M2

## 2019-08-29 PROCEDURE — 99214 OFFICE O/P EST MOD 30 MIN: CPT | Mod: 25

## 2019-08-29 PROCEDURE — 93000 ELECTROCARDIOGRAM COMPLETE: CPT

## 2019-08-29 PROCEDURE — 94010 BREATHING CAPACITY TEST: CPT

## 2019-08-29 RX ORDER — CEFADROXIL 500 MG/1
500 CAPSULE ORAL TWICE DAILY
Qty: 15 | Refills: 0 | Status: DISCONTINUED | COMMUNITY
Start: 2019-06-13 | End: 2019-08-29

## 2019-08-29 RX ORDER — PREDNISONE 10 MG/1
10 TABLET ORAL
Qty: 10 | Refills: 0 | Status: DISCONTINUED | COMMUNITY
Start: 2019-05-23 | End: 2019-08-29

## 2019-08-29 NOTE — ASSESSMENT
[FreeTextEntry1] : Diet is discussed in detail including avoidance of lactose foods. Bowel is well-controlled on lactose is avoided. The importance of weight control with diabetic prevention and control is emphasized.\par \par Hypertension is well compensated. Osteoarthritis is a mild chronic issue.

## 2019-08-29 NOTE — DATA REVIEWED
[FreeTextEntry1] : Electrocardiogram reveals a sinus rhythm with first degree AV block, RSR in V1-no significant change. Spirometry reveals mild restriction but is a technically limited study. Pulse oximetry is 98%.

## 2019-09-05 ENCOUNTER — MEDICATION RENEWAL (OUTPATIENT)
Age: 79
End: 2019-09-05

## 2019-09-20 ENCOUNTER — APPOINTMENT (OUTPATIENT)
Dept: ORTHOPEDIC SURGERY | Facility: CLINIC | Age: 79
End: 2019-09-20
Payer: MEDICARE

## 2019-09-20 VITALS
BODY MASS INDEX: 23.92 KG/M2 | WEIGHT: 135 LBS | HEART RATE: 78 BPM | DIASTOLIC BLOOD PRESSURE: 67 MMHG | HEIGHT: 63 IN | SYSTOLIC BLOOD PRESSURE: 114 MMHG

## 2019-09-20 PROCEDURE — 99213 OFFICE O/P EST LOW 20 MIN: CPT | Mod: 25

## 2019-09-20 PROCEDURE — 20610 DRAIN/INJ JOINT/BURSA W/O US: CPT | Mod: LT

## 2019-10-08 ENCOUNTER — APPOINTMENT (OUTPATIENT)
Dept: ORTHOPEDIC SURGERY | Facility: CLINIC | Age: 79
End: 2019-10-08
Payer: MEDICARE

## 2019-10-08 VITALS
DIASTOLIC BLOOD PRESSURE: 72 MMHG | HEIGHT: 63 IN | SYSTOLIC BLOOD PRESSURE: 157 MMHG | BODY MASS INDEX: 23.92 KG/M2 | HEART RATE: 82 BPM | WEIGHT: 135 LBS

## 2019-10-08 DIAGNOSIS — M79.671 PAIN IN RIGHT FOOT: ICD-10-CM

## 2019-10-08 PROCEDURE — 73630 X-RAY EXAM OF FOOT: CPT | Mod: RT

## 2019-10-08 PROCEDURE — 99214 OFFICE O/P EST MOD 30 MIN: CPT

## 2019-10-10 ENCOUNTER — RX RENEWAL (OUTPATIENT)
Age: 79
End: 2019-10-10

## 2019-10-15 ENCOUNTER — APPOINTMENT (OUTPATIENT)
Dept: ORTHOPEDIC SURGERY | Facility: CLINIC | Age: 79
End: 2019-10-15
Payer: MEDICARE

## 2019-10-15 VITALS
HEIGHT: 63 IN | SYSTOLIC BLOOD PRESSURE: 164 MMHG | HEART RATE: 83 BPM | DIASTOLIC BLOOD PRESSURE: 69 MMHG | WEIGHT: 135 LBS | BODY MASS INDEX: 23.92 KG/M2

## 2019-10-15 DIAGNOSIS — M10.9 GOUT, UNSPECIFIED: ICD-10-CM

## 2019-10-15 DIAGNOSIS — M21.6X1 OTHER ACQUIRED DEFORMITIES OF RIGHT FOOT: ICD-10-CM

## 2019-10-15 PROCEDURE — 99213 OFFICE O/P EST LOW 20 MIN: CPT

## 2019-10-17 ENCOUNTER — RX CHANGE (OUTPATIENT)
Age: 79
End: 2019-10-17

## 2019-10-29 ENCOUNTER — APPOINTMENT (OUTPATIENT)
Dept: ORTHOPEDIC SURGERY | Facility: CLINIC | Age: 79
End: 2019-10-29
Payer: MEDICARE

## 2019-10-29 VITALS
BODY MASS INDEX: 23.92 KG/M2 | HEART RATE: 72 BPM | SYSTOLIC BLOOD PRESSURE: 158 MMHG | DIASTOLIC BLOOD PRESSURE: 69 MMHG | HEIGHT: 63 IN | WEIGHT: 135 LBS

## 2019-10-29 PROCEDURE — 20610 DRAIN/INJ JOINT/BURSA W/O US: CPT | Mod: LT

## 2019-11-05 ENCOUNTER — APPOINTMENT (OUTPATIENT)
Dept: ORTHOPEDIC SURGERY | Facility: CLINIC | Age: 79
End: 2019-11-05
Payer: MEDICARE

## 2019-11-05 VITALS
WEIGHT: 135 LBS | DIASTOLIC BLOOD PRESSURE: 64 MMHG | SYSTOLIC BLOOD PRESSURE: 137 MMHG | HEIGHT: 63 IN | BODY MASS INDEX: 23.92 KG/M2 | HEART RATE: 82 BPM

## 2019-11-05 PROCEDURE — 20610 DRAIN/INJ JOINT/BURSA W/O US: CPT | Mod: LT

## 2019-11-12 ENCOUNTER — APPOINTMENT (OUTPATIENT)
Dept: ORTHOPEDIC SURGERY | Facility: CLINIC | Age: 79
End: 2019-11-12
Payer: MEDICARE

## 2019-11-12 VITALS — HEIGHT: 63 IN | BODY MASS INDEX: 23.92 KG/M2 | WEIGHT: 135 LBS

## 2019-11-12 PROCEDURE — 20610 DRAIN/INJ JOINT/BURSA W/O US: CPT | Mod: LT

## 2019-11-12 NOTE — DISCUSSION/SUMMARY
[de-identified] : Informed consent was obtained. Site and procedure were confirmed with the patient.  Following a sterile prep the left knee was aspirated but no fluid was returned. Synvisc was injected in the left knee without complication. Sterile dressing was applied. Instructions were given.\par She will return as needed.

## 2019-11-12 NOTE — PHYSICAL EXAM
[Slightly Antalgic] : slightly antalgic [Knee] : patellar 2+ and symmetric bilaterally [LE] : Sensory: Intact in bilateral lower extremities [Ankle] : ankle 2+ and symmetric bilaterally [DP] : dorsalis pedis 2+ and symmetric bilaterally [PT] : posterior tibial 2+ and symmetric bilaterally [Normal] : Alert and in no acute distress [Poor Appearance] : well-appearing [Acute Distress] : not in acute distress [Obese] : not obese [de-identified] : The patient has no respiratory distress. Mood and affect are normal. The patient is alert and oriented to person, place and time.\par The patient reports minimal pain with motion of her knees.  She has left knee tenderness.  There is no instability.  There is increased pain with range of motion.  Laurent test is negative.  The calves are soft and nontender.  Both Achilles tendons are intact.  .  There is no lymphedema.

## 2019-11-12 NOTE — HISTORY OF PRESENT ILLNESS
[de-identified] : The patient presents for her third Synvisc injection to the left knee. The previous injections were well tolerated. She is already feeling better.

## 2020-01-02 ENCOUNTER — APPOINTMENT (OUTPATIENT)
Dept: INTERNAL MEDICINE | Facility: CLINIC | Age: 80
End: 2020-01-02
Payer: MEDICARE

## 2020-01-02 VITALS
WEIGHT: 138 LBS | BODY MASS INDEX: 24.45 KG/M2 | OXYGEN SATURATION: 97 % | SYSTOLIC BLOOD PRESSURE: 134 MMHG | TEMPERATURE: 97.8 F | HEART RATE: 68 BPM | DIASTOLIC BLOOD PRESSURE: 76 MMHG | RESPIRATION RATE: 16 BRPM | HEIGHT: 63 IN

## 2020-01-02 VITALS — SYSTOLIC BLOOD PRESSURE: 124 MMHG | DIASTOLIC BLOOD PRESSURE: 76 MMHG

## 2020-01-02 DIAGNOSIS — Z23 ENCOUNTER FOR IMMUNIZATION: ICD-10-CM

## 2020-01-02 PROCEDURE — 94010 BREATHING CAPACITY TEST: CPT

## 2020-01-02 PROCEDURE — 90662 IIV NO PRSV INCREASED AG IM: CPT

## 2020-01-02 PROCEDURE — G0008: CPT

## 2020-01-02 PROCEDURE — 93000 ELECTROCARDIOGRAM COMPLETE: CPT

## 2020-01-02 PROCEDURE — 99214 OFFICE O/P EST MOD 30 MIN: CPT | Mod: 25

## 2020-01-02 RX ORDER — HYALURONATE SODIUM 20 MG/2 ML
20 SYRINGE (ML) INTRAARTICULAR
Qty: 1 | Refills: 0 | Status: DISCONTINUED | OUTPATIENT
Start: 2018-03-29 | End: 2020-01-02

## 2020-01-02 RX ORDER — BRIMONIDINE TARTRATE 2 MG/MG
0.2 SOLUTION/ DROPS OPHTHALMIC
Qty: 5 | Refills: 0 | Status: DISCONTINUED | COMMUNITY
Start: 2017-11-29 | End: 2020-01-02

## 2020-01-02 RX ORDER — MELOXICAM 15 MG/1
15 TABLET ORAL DAILY
Qty: 90 | Refills: 0 | Status: DISCONTINUED | COMMUNITY
Start: 2019-10-08 | End: 2020-01-02

## 2020-01-02 RX ORDER — CETIRIZINE HYDROCHLORIDE 10 MG/1
10 TABLET, COATED ORAL
Qty: 30 | Refills: 3 | Status: DISCONTINUED | COMMUNITY
Start: 2019-04-05 | End: 2020-01-02

## 2020-01-02 RX ORDER — NEOMYCIN SULFATE, POLYMYXIN B SULFATE AND DEXAMETHASONE 3.5; 10000; 1 MG/ML; [USP'U]/ML; MG/ML
3.5-10000-0.1 SUSPENSION OPHTHALMIC
Qty: 1 | Refills: 0 | Status: DISCONTINUED | COMMUNITY
Start: 2019-04-05 | End: 2020-01-02

## 2020-01-02 RX ORDER — HYLAN G-F 20 16MG/2ML
16 SYRINGE (ML) INTRAARTICULAR
Qty: 1 | Refills: 0 | Status: DISCONTINUED | COMMUNITY
Start: 2019-10-17 | End: 2020-01-02

## 2020-01-02 RX ORDER — ACETAMINOPHEN 325 MG/1
TABLET, FILM COATED ORAL
Refills: 0 | Status: DISCONTINUED | COMMUNITY
End: 2020-01-02

## 2020-01-02 RX ORDER — HYALURONATE SODIUM 20 MG/2 ML
20 SYRINGE (ML) INTRAARTICULAR
Qty: 1 | Refills: 0 | Status: DISCONTINUED | COMMUNITY
Start: 2019-10-15 | End: 2020-01-02

## 2020-01-02 NOTE — DATA REVIEWED
[FreeTextEntry1] : electrocardiogram reveals a sinus rhythm with borderline first degree AV block, otherwise normal. Spirometry is normal. Pulse oximetry is 97%.

## 2020-01-02 NOTE — HEALTH RISK ASSESSMENT
[No] : In the past 12 months have you used drugs other than those required for medical reasons? No [No falls in past year] : Patient reported no falls in the past year [0] : 2) Feeling down, depressed, or hopeless: Not at all (0) [] : No [de-identified] : orthopedics [de-identified] : Relatively sedentary [de-identified] : Generally healthy [[id number]] : 0

## 2020-01-02 NOTE — HISTORY OF PRESENT ILLNESS
[FreeTextEntry1] : Followup for hypertension ,osteoarthritis of both knees, Hypercholesterolemia, chronic dyspnea with exertion [de-identified] : Feels well. Knees have been an ongoing issue. Respiratory status is unchanged.\par \par Left knee is particularly problematic.Several injections have been unhelpful.Anti-inflammatorieshave resulted in GI symptoms.

## 2020-01-02 NOTE — REVIEW OF SYSTEMS
[Dyspnea on Exertion] : dyspnea on exertion [Negative] : Neurological [FreeTextEntry4] : intraocular pressures have been reported high and low. [FreeTextEntry9] : Osteoarthritis of knees,see present illness

## 2020-01-02 NOTE — ASSESSMENT
[FreeTextEntry1] : Dyspnea with range of motion and is likely related to her relatively sedentary activity pattern.She works at [employer]. Walks only on the level.\par \par Ophthalmology intraocular pressures have varied, 24 and 14. Referral made.\par \par Hypertension is well controlled. Same Rx.\par \par Influenza immunization is administered to the left arm.\par \par Next visit CPE with chest x-ray.

## 2020-02-06 ENCOUNTER — APPOINTMENT (OUTPATIENT)
Dept: OBGYN | Facility: CLINIC | Age: 80
End: 2020-02-06
Payer: MEDICARE

## 2020-02-06 VITALS
SYSTOLIC BLOOD PRESSURE: 170 MMHG | DIASTOLIC BLOOD PRESSURE: 80 MMHG | BODY MASS INDEX: 24.45 KG/M2 | HEIGHT: 63 IN | WEIGHT: 138 LBS

## 2020-02-06 VITALS — DIASTOLIC BLOOD PRESSURE: 70 MMHG | SYSTOLIC BLOOD PRESSURE: 160 MMHG

## 2020-02-06 DIAGNOSIS — Z01.419 ENCOUNTER FOR GYNECOLOGICAL EXAMINATION (GENERAL) (ROUTINE) W/OUT ABNORMAL FINDINGS: ICD-10-CM

## 2020-02-06 PROCEDURE — 99387 INIT PM E/M NEW PAT 65+ YRS: CPT

## 2020-02-06 NOTE — PHYSICAL EXAM
[Awake] : awake [Acute Distress] : no acute distress [Alert] : alert [Mass] : no breast mass [Nipple Discharge] : no nipple discharge [Soft] : soft [Axillary LAD] : no axillary lymphadenopathy [Distended] : not distended [Tender] : non tender [Oriented x3] : oriented to person, place, and time [Depressed Mood] : not depressed [Normal] : uterus [Atrophy] : atrophy [No Bleeding] : there was no active vaginal bleeding [Uterine Adnexae] : were not tender and not enlarged [CTAB] : CTAB [RRR, No Murmurs] : RRR, no murmurs

## 2020-02-13 LAB — CYTOLOGY CVX/VAG DOC THIN PREP: ABNORMAL

## 2020-06-01 ENCOUNTER — APPOINTMENT (OUTPATIENT)
Dept: ORTHOPEDIC SURGERY | Facility: CLINIC | Age: 80
End: 2020-06-01
Payer: MEDICARE

## 2020-06-01 VITALS
WEIGHT: 138 LBS | BODY MASS INDEX: 24.45 KG/M2 | HEART RATE: 91 BPM | DIASTOLIC BLOOD PRESSURE: 67 MMHG | SYSTOLIC BLOOD PRESSURE: 168 MMHG | TEMPERATURE: 97.8 F | HEIGHT: 63 IN

## 2020-06-01 DIAGNOSIS — S49.91XA UNSPECIFIED INJURY OF RIGHT SHOULDER AND UPPER ARM, INITIAL ENCOUNTER: ICD-10-CM

## 2020-06-01 PROCEDURE — 99214 OFFICE O/P EST MOD 30 MIN: CPT

## 2020-06-01 PROCEDURE — 73080 X-RAY EXAM OF ELBOW: CPT | Mod: RT

## 2020-06-01 NOTE — REVIEW OF SYSTEMS
[Joint Pain] : joint pain [Joint Stiffness] : no joint stiffness [Joint Swelling] : joint swelling [Negative] : Heme/Lymph

## 2020-06-01 NOTE — PHYSICAL EXAM
[UE] : Sensory: Intact in bilateral upper extremities [Bicep] : biceps 2+ and symmetric bilaterally [B.R.] : biceps 2+ and symmetric bilaterally [Tricep] : triceps 2+ and symmetric bilaterally [Rad] : radial 2+ and symmetric bilaterally [Normal] : Alert and in no acute distress [Poor Appearance] : well-appearing [Acute Distress] : not in acute distress [Obese] : not obese [de-identified] : The patient has no respiratory distress. Mood and affect are normal. The patient is alert and oriented to person, place and time.\par Examination of the right shoulder demonstrates no swelling, no deformity and no tenderness. The shoulder is stable. Drop arm test is negative. [place] test is negative. Lifto[place] test is negative. Motor strength is 5 over 5 in all groups. Range of motion is full and identical to that of the left shoulder. Flexion is 160°, abduction 160°, external rotation 45° and internal rotation to the lower thoracic level.\par Examination of the right arm and elbow demonstrates mild tenderness of the biceps muscle.  The distal biceps tendon is intact and nontender.  Biceps and triceps function are normal.  There is no instability of either elbow.  The skin is intact.  There is no lymphedema. [de-identified] : AP, lateral and oblique x-rays of the right elbow demonstrate no fracture, no dislocation and no bony abnormality.

## 2020-06-01 NOTE — DISCUSSION/SUMMARY
[de-identified] : The patient has sustained a sprain to the right biceps muscle.  This seems to be improving.  I have discussed the pathology, natural history and treatment options with her.  She will take Tylenol for pain and gradually increase activities.  If not better in 2 weeks she will be reevaluated.

## 2020-06-01 NOTE — HISTORY OF PRESENT ILLNESS
[de-identified] : 79 year old RHD female presents today c/o pain in the right arm after flipping her mattress on 5/27/20. She states that the following day she started to experience pain around the elbow and biceps. She is applying ice and is taking Tylenol as needed for pain. The pain is gradually improving. She denies any numbness or tingling.

## 2020-07-21 ENCOUNTER — LABORATORY RESULT (OUTPATIENT)
Age: 80
End: 2020-07-21

## 2020-07-21 ENCOUNTER — NON-APPOINTMENT (OUTPATIENT)
Age: 80
End: 2020-07-21

## 2020-07-21 ENCOUNTER — APPOINTMENT (OUTPATIENT)
Dept: INTERNAL MEDICINE | Facility: CLINIC | Age: 80
End: 2020-07-21
Payer: MEDICARE

## 2020-07-21 VITALS
HEART RATE: 82 BPM | OXYGEN SATURATION: 97 % | HEIGHT: 63 IN | TEMPERATURE: 97.8 F | DIASTOLIC BLOOD PRESSURE: 62 MMHG | BODY MASS INDEX: 25.16 KG/M2 | WEIGHT: 142 LBS | RESPIRATION RATE: 16 BRPM | SYSTOLIC BLOOD PRESSURE: 128 MMHG

## 2020-07-21 DIAGNOSIS — R06.00 DYSPNEA, UNSPECIFIED: ICD-10-CM

## 2020-07-21 DIAGNOSIS — Z11.59 ENCOUNTER FOR SCREENING FOR OTHER VIRAL DISEASES: ICD-10-CM

## 2020-07-21 PROCEDURE — 93000 ELECTROCARDIOGRAM COMPLETE: CPT

## 2020-07-21 PROCEDURE — G0439: CPT

## 2020-07-21 PROCEDURE — 36415 COLL VENOUS BLD VENIPUNCTURE: CPT

## 2020-07-21 PROCEDURE — 71046 X-RAY EXAM CHEST 2 VIEWS: CPT

## 2020-07-21 NOTE — DATA REVIEWED
[FreeTextEntry1] : Electrocardiogram reveals a sinus rhythm with first degree AV block, no change\par \par Chest x-ray reveals a densely calcified aorta Bilateral breast implants.Lung fields are clear.Heart size is normal. Bony thorax is normal for age   No active disease.\par \par Blood work and urinalysis results are normal.

## 2020-07-21 NOTE — REVIEW OF SYSTEMS
[Dyspnea on Exertion] : dyspnea on exertion [Negative] : Psychiatric [FreeTextEntry4] : intraocular pressures have been reported high and low. [FreeTextEntry9] : Osteoarthritis of knees,see present illness

## 2020-07-21 NOTE — HEALTH RISK ASSESSMENT
[Very Good] : ~his/her~  mood as very good [No] : No [No falls in past year] : Patient reported no falls in the past year [0] : 2) Feeling down, depressed, or hopeless: Not at all (0) [Patient reported PAP Smear was normal] : Patient reported PAP Smear was normal [Patient reported mammogram was normal] : Patient reported mammogram was normal [Patient reported colonoscopy was normal] : Patient reported colonoscopy was normal [HIV test declined] : HIV test declined [Hepatitis C test declined] : Hepatitis C test declined [None] : None [With Family] : lives with family [Retired] : retired [] :  [Feels Safe at Home] : Feels safe at home [Fully functional (using the telephone, shopping, preparing meals, housekeeping, doing laundry, using] : Fully functional and needs no help or supervision to perform IADLs (using the telephone, shopping, preparing meals, housekeeping, doing laundry, using transportation, managing medications and managing finances) [Fully functional (bathing, dressing, toileting, transferring, walking, feeding)] : Fully functional (bathing, dressing, toileting, transferring, walking, feeding) [Reports normal functional visual acuity (ie: able to read med bottle)] : Reports normal functional visual acuity [Smoke Detector] : smoke detector [Carbon Monoxide Detector] : carbon monoxide detector [Seat Belt] :  uses seat belt [Safety elements used in home] : safety elements used in home [Sunscreen] : uses sunscreen [Patient/Caregiver unclear of wishes] : Patient/Caregiver unclear of wishes [FreeTextEntry1] : gen. health blood pressure [] : No [de-identified] : walks for short distances [de-identified] : orthopedics-right arm pain, gone now [de-identified] : healthy poor tolerance for peppers [[id number]] : 0 [Change in mental status noted] : No change in mental status noted [Language] : denies difficulty with language [Learning/Retaining New Information] : denies difficulty learning/retaining new information [Behavior] : denies difficulty with behavior [Handling Complex Tasks] : denies difficulty handling complex tasks [Reasoning] : denies difficulty with reasoning [Spatial Ability and Orientation] : denies difficulty with spatial ability and orientation [Sexually Active] : not sexually active [High Risk Behavior] : no high risk behavior [Reports changes in hearing] : Reports no changes in hearing [Reports changes in vision] : Reports no changes in vision [Reports changes in dental health] : Reports no changes in dental health [Guns at Home] : no guns at home [Travel to Developing Areas] : does not  travel to developing areas [TB Exposure] : is not being exposed to tuberculosis [Caregiver Concerns] : does not have caregiver concerns [MammogramDate] : 2/20 [PapSmearDate] : 2/20 [ColonoscopyDate] : 9/12 [AdvancecareDate] : 7/20

## 2020-07-21 NOTE — HISTORY OF PRESENT ILLNESS
[FreeTextEntry1] : comprehensive annual examination [de-identified] : Followup for hypertension, hypercholesterolemia, generalized osteoarthritis\par Generally feels well. Joint stiffness in the morning\par 4 pound weight gain since quarantine

## 2020-07-22 LAB
25(OH)D3 SERPL-MCNC: 18.8 NG/ML
ALBUMIN SERPL ELPH-MCNC: 4.6 G/DL
ALP BLD-CCNC: 58 U/L
ALT SERPL-CCNC: 12 U/L
ANION GAP SERPL CALC-SCNC: 15 MMOL/L
APPEARANCE: CLEAR
AST SERPL-CCNC: 17 U/L
BASOPHILS # BLD AUTO: 0.06 K/UL
BASOPHILS NFR BLD AUTO: 0.8 %
BILIRUB SERPL-MCNC: 0.6 MG/DL
BILIRUBIN URINE: NEGATIVE
BLOOD URINE: NORMAL
BUN SERPL-MCNC: 21 MG/DL
CALCIUM SERPL-MCNC: 10.3 MG/DL
CHLORIDE SERPL-SCNC: 100 MMOL/L
CHOLEST SERPL-MCNC: 168 MG/DL
CHOLEST/HDLC SERPL: 6.2 RATIO
CO2 SERPL-SCNC: 24 MMOL/L
COLOR: NORMAL
CREAT SERPL-MCNC: 0.81 MG/DL
EOSINOPHIL # BLD AUTO: 0.33 K/UL
EOSINOPHIL NFR BLD AUTO: 4.4 %
ESTIMATED AVERAGE GLUCOSE: 103 MG/DL
GLUCOSE QUALITATIVE U: NEGATIVE
GLUCOSE SERPL-MCNC: 99 MG/DL
HBA1C MFR BLD HPLC: 5.2 %
HCT VFR BLD CALC: 39.7 %
HDLC SERPL-MCNC: 27 MG/DL
HGB BLD-MCNC: 12.7 G/DL
IMM GRANULOCYTES NFR BLD AUTO: 0.3 %
KETONES URINE: NEGATIVE
LDLC SERPL CALC-MCNC: NORMAL MG/DL
LEUKOCYTE ESTERASE URINE: NEGATIVE
LYMPHOCYTES # BLD AUTO: 2.24 K/UL
LYMPHOCYTES NFR BLD AUTO: 29.9 %
MAN DIFF?: NORMAL
MCHC RBC-ENTMCNC: 29.7 PG
MCHC RBC-ENTMCNC: 32 GM/DL
MCV RBC AUTO: 93 FL
MONOCYTES # BLD AUTO: 0.43 K/UL
MONOCYTES NFR BLD AUTO: 5.7 %
NEUTROPHILS # BLD AUTO: 4.4 K/UL
NEUTROPHILS NFR BLD AUTO: 58.9 %
NITRITE URINE: NEGATIVE
PH URINE: 6
PLATELET # BLD AUTO: 226 K/UL
POTASSIUM SERPL-SCNC: 3.6 MMOL/L
PROT SERPL-MCNC: 7.7 G/DL
PROTEIN URINE: NEGATIVE
RBC # BLD: 4.27 M/UL
RBC # FLD: 12.3 %
SODIUM SERPL-SCNC: 138 MMOL/L
SPECIFIC GRAVITY URINE: 1.01
T3RU NFR SERPL: 1.1 TBI
T4 SERPL-MCNC: 5.4 UG/DL
TRIGL SERPL-MCNC: 603 MG/DL
TSH SERPL-ACNC: 2.15 UIU/ML
UROBILINOGEN URINE: NORMAL
WBC # FLD AUTO: 7.48 K/UL

## 2020-08-05 ENCOUNTER — APPOINTMENT (OUTPATIENT)
Dept: INTERNAL MEDICINE | Facility: CLINIC | Age: 80
End: 2020-08-05
Payer: MEDICARE

## 2020-08-05 PROCEDURE — 77080 DXA BONE DENSITY AXIAL: CPT

## 2020-08-31 ENCOUNTER — RX RENEWAL (OUTPATIENT)
Age: 80
End: 2020-08-31

## 2020-10-12 ENCOUNTER — APPOINTMENT (OUTPATIENT)
Dept: ORTHOPEDIC SURGERY | Facility: CLINIC | Age: 80
End: 2020-10-12
Payer: MEDICARE

## 2020-10-12 VITALS
HEIGHT: 63 IN | SYSTOLIC BLOOD PRESSURE: 157 MMHG | HEART RATE: 92 BPM | TEMPERATURE: 96.7 F | WEIGHT: 142 LBS | BODY MASS INDEX: 25.16 KG/M2 | DIASTOLIC BLOOD PRESSURE: 71 MMHG

## 2020-10-12 PROCEDURE — 20610 DRAIN/INJ JOINT/BURSA W/O US: CPT | Mod: LT

## 2020-10-12 PROCEDURE — 73564 X-RAY EXAM KNEE 4 OR MORE: CPT | Mod: LT

## 2020-10-12 PROCEDURE — 99213 OFFICE O/P EST LOW 20 MIN: CPT | Mod: 25

## 2020-11-24 ENCOUNTER — APPOINTMENT (OUTPATIENT)
Dept: INTERNAL MEDICINE | Facility: CLINIC | Age: 80
End: 2020-11-24
Payer: MEDICARE

## 2020-11-24 VITALS
RESPIRATION RATE: 16 BRPM | WEIGHT: 144 LBS | DIASTOLIC BLOOD PRESSURE: 74 MMHG | HEART RATE: 88 BPM | HEIGHT: 63 IN | TEMPERATURE: 96.8 F | BODY MASS INDEX: 25.52 KG/M2 | OXYGEN SATURATION: 98 % | SYSTOLIC BLOOD PRESSURE: 146 MMHG

## 2020-11-24 VITALS — DIASTOLIC BLOOD PRESSURE: 72 MMHG | SYSTOLIC BLOOD PRESSURE: 128 MMHG

## 2020-11-24 DIAGNOSIS — E73.9 LACTOSE INTOLERANCE, UNSPECIFIED: ICD-10-CM

## 2020-11-24 PROCEDURE — 99214 OFFICE O/P EST MOD 30 MIN: CPT

## 2020-11-24 NOTE — HEALTH RISK ASSESSMENT
[No] : In the past 12 months have you used drugs other than those required for medical reasons? No [No falls in past year] : Patient reported no falls in the past year [0] : 2) Feeling down, depressed, or hopeless: Not at all (0) [] : No [de-identified] : Orthopedic [de-identified] : Walks around the block once a day [[id number]] : 0

## 2020-11-24 NOTE — REVIEW OF SYSTEMS
[Dyspnea on Exertion] : dyspnea on exertion [Negative] : Psychiatric [FreeTextEntry4] : intraocular pressures have been reported high and low. [FreeTextEntry7] : Except for gas-see present illness [FreeTextEntry9] : Osteoarthritis of knees,see present illness

## 2020-11-24 NOTE — HISTORY OF PRESENT ILLNESS
[FreeTextEntry1] : Followup for hypertension, weight gain, Lactose intolerance, hypercholesterolemia [de-identified] : Feels well except complains of gas. Extensive dietary discussion. Her diet consists of almost entirely. She drinks a modest amount of milk every day-lactose free. A lot of vegetables. He has a slight predilection for fried foods

## 2020-11-24 NOTE — ASSESSMENT
[FreeTextEntry1] : Blood pressure control is marginal but probably okay. There has been a weight gain Her family is significantly overweight and she is warned.Considerable time was spent discussing calorie control and the possibility of a formal plan likely Weight watcher's.\par Recheck in 4 months-blood pressure weight -EKG

## 2020-12-21 PROBLEM — Z87.09 HISTORY OF LARYNGITIS: Status: RESOLVED | Noted: 2019-05-23 | Resolved: 2020-12-21

## 2020-12-21 PROBLEM — Z87.09 HISTORY OF SORE THROAT: Status: RESOLVED | Noted: 2019-05-23 | Resolved: 2020-12-21

## 2020-12-23 PROBLEM — Z01.419 ENCOUNTER FOR GYNECOLOGICAL EXAMINATION WITH PAPANICOLAOU SMEAR OF CERVIX: Status: RESOLVED | Noted: 2020-02-06 | Resolved: 2020-12-23

## 2021-03-16 ENCOUNTER — APPOINTMENT (OUTPATIENT)
Dept: ORTHOPEDIC SURGERY | Facility: CLINIC | Age: 81
End: 2021-03-16
Payer: MEDICARE

## 2021-03-16 PROCEDURE — 99072 ADDL SUPL MATRL&STAF TM PHE: CPT

## 2021-03-16 PROCEDURE — 99213 OFFICE O/P EST LOW 20 MIN: CPT | Mod: 25

## 2021-03-16 PROCEDURE — 20610 DRAIN/INJ JOINT/BURSA W/O US: CPT | Mod: LT

## 2021-03-16 NOTE — PHYSICAL EXAM
[LE] : Sensory: Intact in bilateral lower extremities [Tricep] : triceps 2+ and symmetric bilaterally [Knee] : patellar 2+ and symmetric bilaterally [Ankle] : ankle 2+ and symmetric bilaterally [DP] : dorsalis pedis 2+ and symmetric bilaterally [PT] : posterior tibial 2+ and symmetric bilaterally [Normal] : Alert and in no acute distress [Poor Appearance] : well-appearing [Acute Distress] : not in acute distress [Obese] : not obese [de-identified] : The patient has no respiratory distress. Mood and affect are normal. The patient is alert and oriented to person, place and time.\par The patient reports no pain with motion of the hips.  There is no tenderness of either hip.  Examination of the knees demonstrates medial tenderness of the left knee.  Quadriceps and hamstring function are intact.  The collateral and cruciate ligaments are stable.  Range of motion 0 to 100 degrees.  The calves are soft and nontender.  The skin is intact.  There is no lymphedema.

## 2021-03-16 NOTE — DISCUSSION/SUMMARY
[de-identified] : The patient has symptomatic osteoarthritis of the left knee.  I have discussed the pathology, natural history and treatment options.  She would like to try a corticosteroid injection.\par Informed consent was obtained. Site and procedure were confirmed with the patient. Following a sterile prep the left knee was aspirated but no fluid was returned. 1 cc of Depo-Medrol and 4 cc of 1% Xylocaine was injected in the left knee without complication. Sterile dressing was applied. Instructions were given.\par She will return as needed.

## 2021-03-16 NOTE — HISTORY OF PRESENT ILLNESS
[de-identified] : The patient presents today complaining of increasing left knee pain x 1 month. She was treated with a cortisone injection to the left knee in October which helped to relieve pain for several months. For the past one month she has had difficulty sleeping because of pain in the left knee. Her knee feels swollen.  She has pain when walking one block around her neighborhood. She takes Tylenol before bed and during the day as needed. She has trouble tolerating antiinflammatory medication.

## 2021-04-06 ENCOUNTER — NON-APPOINTMENT (OUTPATIENT)
Age: 81
End: 2021-04-06

## 2021-04-06 ENCOUNTER — APPOINTMENT (OUTPATIENT)
Dept: INTERNAL MEDICINE | Facility: CLINIC | Age: 81
End: 2021-04-06
Payer: MEDICARE

## 2021-04-06 VITALS
DIASTOLIC BLOOD PRESSURE: 80 MMHG | HEART RATE: 84 BPM | TEMPERATURE: 98 F | WEIGHT: 142 LBS | BODY MASS INDEX: 25.16 KG/M2 | OXYGEN SATURATION: 98 % | SYSTOLIC BLOOD PRESSURE: 140 MMHG | HEIGHT: 63 IN

## 2021-04-06 VITALS — DIASTOLIC BLOOD PRESSURE: 70 MMHG | SYSTOLIC BLOOD PRESSURE: 136 MMHG

## 2021-04-06 DIAGNOSIS — J30.1 ALLERGIC RHINITIS DUE TO POLLEN: ICD-10-CM

## 2021-04-06 DIAGNOSIS — M17.12 UNILATERAL PRIMARY OSTEOARTHRITIS, LEFT KNEE: ICD-10-CM

## 2021-04-06 PROCEDURE — 99072 ADDL SUPL MATRL&STAF TM PHE: CPT

## 2021-04-06 PROCEDURE — 93000 ELECTROCARDIOGRAM COMPLETE: CPT

## 2021-04-06 PROCEDURE — 99214 OFFICE O/P EST MOD 30 MIN: CPT | Mod: 25

## 2021-04-06 RX ORDER — LOSARTAN POTASSIUM AND HYDROCHLOROTHIAZIDE 25; 100 MG/1; MG/1
100-25 TABLET ORAL
Qty: 90 | Refills: 3 | Status: DISCONTINUED | COMMUNITY
Start: 2017-12-20 | End: 2021-04-06

## 2021-04-06 NOTE — HISTORY OF PRESENT ILLNESS
[FreeTextEntry1] : Follow up for hypertension., Osteoarthritis of both knees. [de-identified] : She has been very inactive for the past year. She previously worked as a manager at a [employer].  Osteoarthritis of both knees has been a limiting factor, left greater than right\par \par She also complains of severe nasal columella to see along with Itchy eyes.

## 2021-04-06 NOTE — HEALTH RISK ASSESSMENT
[No] : In the past 12 months have you used drugs other than those required for medical reasons? No [No falls in past year] : Patient reported no falls in the past year [0] : 2) Feeling down, depressed, or hopeless: Not at all (0) [] : No [de-identified] : Orthopedic [de-identified] : Sedentary [de-identified] : Generally healthy [[id number]] : 0

## 2021-04-06 NOTE — ASSESSMENT
[FreeTextEntry1] : Hypertension is adequately controlled/borderline. Pollen allergy will be addressed with Flonase 10 mg and Claritin. \par \par She will followup with orthopedics with reference to her knees so that she can attempt to get at least a walking program started.\par \par CPE after 7/21 ith chest x-ray.

## 2021-06-24 ENCOUNTER — APPOINTMENT (OUTPATIENT)
Dept: ORTHOPEDIC SURGERY | Facility: CLINIC | Age: 81
End: 2021-06-24
Payer: MEDICARE

## 2021-06-24 VITALS
BODY MASS INDEX: 25.16 KG/M2 | HEIGHT: 63 IN | SYSTOLIC BLOOD PRESSURE: 205 MMHG | DIASTOLIC BLOOD PRESSURE: 95 MMHG | HEART RATE: 84 BPM | WEIGHT: 142 LBS

## 2021-06-24 PROCEDURE — 72100 X-RAY EXAM L-S SPINE 2/3 VWS: CPT

## 2021-06-24 PROCEDURE — 99072 ADDL SUPL MATRL&STAF TM PHE: CPT

## 2021-06-24 PROCEDURE — 99214 OFFICE O/P EST MOD 30 MIN: CPT

## 2021-06-24 NOTE — PHYSICAL EXAM
[DP] : dorsalis pedis 2+ and symmetric bilaterally [PT] : posterior tibial 2+ and symmetric bilaterally [Normal] : Alert and in no acute distress [Poor Appearance] : well-appearing [Acute Distress] : not in acute distress [Obese] : not obese [de-identified] : The patient has no respiratory distress. Mood and affect are normal. The patient is alert and oriented to person, place and time.\par Examination of the lumbar spine demonstrates tenderness to the left of the midline. There is mild muscle spasm. There is no deformity. Lumbar flexion is 90°, right lateral flexion 10° and left lateral flexion 10°. Straight leg raise test is negative. Lower extremity neurologic exam is intact with regard to sensation, motor function and deep tendon reflexes.\par Trendelenburg is negative.  There is no pain with active or passive motion of the hips.  The calves are soft and nontender.  The skin is intact.  There is no lymphedema. [de-identified] : AP and lateral x-rays of the lumbar spine demonstrate multilevel degenerative changes.  There are no fractures or dislocations.

## 2021-06-24 NOTE — DISCUSSION/SUMMARY
[de-identified] : The patient has left-sided sciatica.  Her back pain is receding.  I have discussed the pathology, natural history and treatment options with her.  She needs to go tomorrow to her granddaughter's graduation and is quite uncomfortable.  She does not tolerate NSAIDs.  I have started her on a Medrol Dosepak.  Medication risks have been reviewed.  She will let us know how she is doing next week.

## 2021-06-24 NOTE — HISTORY OF PRESENT ILLNESS
[de-identified] : 80 year old female presents with left leg pain after bending over with a dust pan to sweep up her floor on 6/18/21. She woke the following day with pain on the left side of her lower back. As the pain in her back subsided she began to have pain over the left posterior thigh and calf. She is taking Tylenol which helps temporarily. She has difficulty tolerating NSAIDs. She reports numbness of her left calf today. She denies history of prior lower back pain.

## 2021-07-01 ENCOUNTER — NON-APPOINTMENT (OUTPATIENT)
Age: 81
End: 2021-07-01

## 2021-07-14 ENCOUNTER — LABORATORY RESULT (OUTPATIENT)
Age: 81
End: 2021-07-14

## 2021-07-14 ENCOUNTER — APPOINTMENT (OUTPATIENT)
Dept: INTERNAL MEDICINE | Facility: CLINIC | Age: 81
End: 2021-07-14
Payer: MEDICARE

## 2021-07-14 PROCEDURE — 99072 ADDL SUPL MATRL&STAF TM PHE: CPT

## 2021-07-14 PROCEDURE — 36415 COLL VENOUS BLD VENIPUNCTURE: CPT

## 2021-07-15 LAB
ALBUMIN SERPL ELPH-MCNC: 4.5 G/DL
ALP BLD-CCNC: 58 U/L
ALT SERPL-CCNC: 18 U/L
ANION GAP SERPL CALC-SCNC: 16 MMOL/L
AST SERPL-CCNC: 20 U/L
BASOPHILS # BLD AUTO: 0.04 K/UL
BASOPHILS NFR BLD AUTO: 0.5 %
BILIRUB SERPL-MCNC: 0.7 MG/DL
BUN SERPL-MCNC: 22 MG/DL
CALCIUM SERPL-MCNC: 10.5 MG/DL
CHLORIDE SERPL-SCNC: 102 MMOL/L
CHOLEST SERPL-MCNC: 165 MG/DL
CO2 SERPL-SCNC: 23 MMOL/L
CREAT SERPL-MCNC: 0.82 MG/DL
EOSINOPHIL # BLD AUTO: 0.27 K/UL
EOSINOPHIL NFR BLD AUTO: 3.6 %
ESTIMATED AVERAGE GLUCOSE: 105 MG/DL
GLUCOSE SERPL-MCNC: 101 MG/DL
HBA1C MFR BLD HPLC: 5.3 %
HCT VFR BLD CALC: 41.3 %
HDLC SERPL-MCNC: 33 MG/DL
HGB BLD-MCNC: 13.4 G/DL
IMM GRANULOCYTES NFR BLD AUTO: 0.3 %
LDLC SERPL CALC-MCNC: NORMAL MG/DL
LDLC SERPL DIRECT ASSAY-MCNC: 71 MG/DL
LYMPHOCYTES # BLD AUTO: 1.98 K/UL
LYMPHOCYTES NFR BLD AUTO: 26.2 %
MAN DIFF?: NORMAL
MCHC RBC-ENTMCNC: 30 PG
MCHC RBC-ENTMCNC: 32.4 GM/DL
MCV RBC AUTO: 92.6 FL
MONOCYTES # BLD AUTO: 0.52 K/UL
MONOCYTES NFR BLD AUTO: 6.9 %
NEUTROPHILS # BLD AUTO: 4.72 K/UL
NEUTROPHILS NFR BLD AUTO: 62.5 %
NONHDLC SERPL-MCNC: 132 MG/DL
PLATELET # BLD AUTO: 215 K/UL
POTASSIUM SERPL-SCNC: 3.9 MMOL/L
PROT SERPL-MCNC: 7.5 G/DL
RBC # BLD: 4.46 M/UL
RBC # FLD: 12.5 %
SAVE SPECIMEN: NORMAL
SODIUM SERPL-SCNC: 141 MMOL/L
T3 SERPL-MCNC: 101 NG/DL
T3RU NFR SERPL: 1.1 TBI
T4 FREE SERPL-MCNC: 1 NG/DL
TRIGL SERPL-MCNC: 451 MG/DL
TSH SERPL-ACNC: 3.27 UIU/ML
WBC # FLD AUTO: 7.55 K/UL

## 2021-07-16 ENCOUNTER — NON-APPOINTMENT (OUTPATIENT)
Age: 81
End: 2021-07-16

## 2021-07-19 ENCOUNTER — APPOINTMENT (OUTPATIENT)
Dept: ORTHOPEDIC SURGERY | Facility: CLINIC | Age: 81
End: 2021-07-19
Payer: MEDICARE

## 2021-07-19 VITALS
SYSTOLIC BLOOD PRESSURE: 155 MMHG | HEIGHT: 63 IN | WEIGHT: 142 LBS | HEART RATE: 87 BPM | DIASTOLIC BLOOD PRESSURE: 77 MMHG | BODY MASS INDEX: 25.16 KG/M2

## 2021-07-19 PROCEDURE — 99072 ADDL SUPL MATRL&STAF TM PHE: CPT

## 2021-07-19 PROCEDURE — 99213 OFFICE O/P EST LOW 20 MIN: CPT

## 2021-07-19 NOTE — PHYSICAL EXAM
[DP] : dorsalis pedis 2+ and symmetric bilaterally [PT] : posterior tibial 2+ and symmetric bilaterally [Normal] : Alert and in no acute distress [Poor Appearance] : well-appearing [Acute Distress] : not in acute distress [Obese] : not obese [de-identified] : The patient has no respiratory distress. Mood and affect are normal. The patient is alert and oriented to person, place and time.\par Examination of the lumbar spine demonstrates tenderness to the left of the midline. There is mild muscle spasm. There is no deformity. Lumbar flexion is 90°, right lateral flexion 10° and left lateral flexion 10°. Straight leg raise test is negative. Lower extremity neurologic exam is intact with regard to sensation, motor function and deep tendon reflexes.\par Trendelenburg is negative.  There is no pain with active or passive motion of the hips.  The calves are soft and nontender.  The skin is intact.  There is no lymphedema.

## 2021-07-19 NOTE — DISCUSSION/SUMMARY
[de-identified] : The patient has had a recurrence of her left-sided sciatica.  I have discussed pathology, natural history and treatment options with her.  She will try another course of steroids.  If that is not helpful she will consider physical therapy. no diabetes and no thyroid trouble.

## 2021-07-19 NOTE — HISTORY OF PRESENT ILLNESS
[de-identified] : The patient presents for follow up of left sided sciatica. She was doing much better until last week when she mopped her floor. She has been experiencing pain on the left side of her lower back with occasional radiation to the left posterior thigh. Symptoms occur only at night when laying down. She is taking Tylenol without relief. She denies lower extremity numbness and tingling.

## 2021-07-23 ENCOUNTER — NON-APPOINTMENT (OUTPATIENT)
Age: 81
End: 2021-07-23

## 2021-07-23 ENCOUNTER — MED ADMIN CHARGE (OUTPATIENT)
Age: 81
End: 2021-07-23

## 2021-07-23 ENCOUNTER — APPOINTMENT (OUTPATIENT)
Dept: INTERNAL MEDICINE | Facility: CLINIC | Age: 81
End: 2021-07-23
Payer: MEDICARE

## 2021-07-23 VITALS
BODY MASS INDEX: 25.16 KG/M2 | SYSTOLIC BLOOD PRESSURE: 130 MMHG | TEMPERATURE: 97.9 F | HEIGHT: 63 IN | DIASTOLIC BLOOD PRESSURE: 60 MMHG | HEART RATE: 63 BPM | WEIGHT: 142 LBS | OXYGEN SATURATION: 97 %

## 2021-07-23 VITALS — DIASTOLIC BLOOD PRESSURE: 60 MMHG | SYSTOLIC BLOOD PRESSURE: 126 MMHG

## 2021-07-23 DIAGNOSIS — R73.09 OTHER ABNORMAL GLUCOSE: ICD-10-CM

## 2021-07-23 DIAGNOSIS — Z23 ENCOUNTER FOR IMMUNIZATION: ICD-10-CM

## 2021-07-23 DIAGNOSIS — M54.17 RADICULOPATHY, LUMBOSACRAL REGION: ICD-10-CM

## 2021-07-23 PROCEDURE — G0439: CPT

## 2021-07-23 PROCEDURE — 93000 ELECTROCARDIOGRAM COMPLETE: CPT

## 2021-07-23 PROCEDURE — 99072 ADDL SUPL MATRL&STAF TM PHE: CPT

## 2021-07-23 PROCEDURE — 99214 OFFICE O/P EST MOD 30 MIN: CPT | Mod: 25

## 2021-07-23 NOTE — REVIEW OF SYSTEMS
[Dyspnea on Exertion] : dyspnea on exertion [Negative] : Psychiatric [FreeTextEntry4] : intraocular pressures have been reported high and low. [FreeTextEntry7] : Except for gas-see present illness [FreeTextEntry9] : See present illness with reference to back and left radiculopathy .

## 2021-07-23 NOTE — HEALTH RISK ASSESSMENT
[Excellent] : ~his/her~  mood as  excellent [No] : In the past 12 months have you used drugs other than those required for medical reasons? No [No falls in past year] : Patient reported no falls in the past year [0] : 2) Feeling down, depressed, or hopeless: Not at all (0) [Patient reported mammogram was normal] : Patient reported mammogram was normal [Patient reported bone density results were normal] : Patient reported bone density results were normal [Patient reported colonoscopy was normal] : Patient reported colonoscopy was normal [HIV test declined] : HIV test declined [Hepatitis C test declined] : Hepatitis C test declined [Learning/Retaining New Information] : difficulty learning/retaining new information [None] : None [Alone] : lives alone [Retired] : retired [] :  [Fully functional (bathing, dressing, toileting, transferring, walking, feeding)] : Fully functional (bathing, dressing, toileting, transferring, walking, feeding) [Fully functional (using the telephone, shopping, preparing meals, housekeeping, doing laundry, using] : Fully functional and needs no help or supervision to perform IADLs (using the telephone, shopping, preparing meals, housekeeping, doing laundry, using transportation, managing medications and managing finances) [Reports normal functional visual acuity (ie: able to read med bottle)] : Reports normal functional visual acuity [Smoke Detector] : smoke detector [Carbon Monoxide Detector] : carbon monoxide detector [Safety elements used in home] : safety elements used in home [Seat Belt] :  uses seat belt [Sunscreen] : uses sunscreen [Patient/Caregiver not ready to engage] : , patient/caregiver not ready to engage [FreeTextEntry1] : General health [] : No [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 [de-identified] : Walks for exercise. [de-identified] : Healthy [[id number]] : 0 [Change in mental status noted] : No change in mental status noted [Language] : denies difficulty with language [Behavior] : denies difficulty with behavior [Handling Complex Tasks] : denies difficulty handling complex tasks [Reasoning] : denies difficulty with reasoning [Spatial Ability and Orientation] : denies difficulty with spatial ability and orientation [Sexually Active] : not sexually active [High Risk Behavior] : no high risk behavior [Reports changes in hearing] : Reports no changes in hearing [Reports changes in vision] : Reports no changes in vision [Reports changes in dental health] : Reports no changes in dental health [Guns at Home] : no guns at home [Travel to Developing Areas] : does not  travel to developing areas [TB Exposure] : is not being exposed to tuberculosis [Caregiver Concerns] : does not have caregiver concerns [MammogramDate] : 2/2020 [BoneDensityDate] : 8/2020 [ColonoscopyDate] : 9/2012 [de-identified] : Memory not as sharp.  She compensates by keeping copious notes and records [de-identified] : Retired for the past year and a half, well-adjusted now [AdvancecareDate] : 7/2021

## 2021-07-23 NOTE — ASSESSMENT
[FreeTextEntry1] : Left lumbosacral radiculopathy appears to be improving.  She will follow up with orthopedics.  She is warned about aggravating the injury.\par \par Hypertension is well controlled.\par \par She is very upset that her daughter is going to be studying in [place] to be a psychiatrist.\par \par She will inquire about coverage for the shingles vaccine from her insurance company.\par \par Same Rx.  Recheck in 4 months with EKG

## 2021-07-23 NOTE — DATA REVIEWED
[FreeTextEntry1] : Electrocardiogram reveals a regular sinus rhythm and is within normal limits\par \par Blood work is reviewed and is satisfactory.  Sugar is prediabetic, A1c is normal

## 2021-07-23 NOTE — HISTORY OF PRESENT ILLNESS
[FreeTextEntry1] : Comprehensive annual examination [de-identified] : Several weeks ago she was doing some heavy lifting and developed back pain which radiated down her left leg associated with tingling.  This is almost to the ankle.  She was seen by orthopedics–Dr. Lux.  He prescribed a brief course of steroids.  She is significantly improved but has some persisting pain.

## 2021-07-25 LAB
APPEARANCE: CLEAR
BILIRUBIN URINE: NEGATIVE
BLOOD URINE: NEGATIVE
COLOR: COLORLESS
GLUCOSE QUALITATIVE U: NEGATIVE
KETONES URINE: NEGATIVE
LEUKOCYTE ESTERASE URINE: NEGATIVE
NITRITE URINE: NEGATIVE
PH URINE: 6
PROTEIN URINE: NEGATIVE
SPECIFIC GRAVITY URINE: 1.01
UROBILINOGEN URINE: NORMAL

## 2021-07-28 ENCOUNTER — APPOINTMENT (OUTPATIENT)
Dept: INTERNAL MEDICINE | Facility: CLINIC | Age: 81
End: 2021-07-28
Payer: MEDICARE

## 2021-07-28 PROCEDURE — 90750 HZV VACC RECOMBINANT IM: CPT

## 2021-07-28 PROCEDURE — 99072 ADDL SUPL MATRL&STAF TM PHE: CPT

## 2021-07-28 PROCEDURE — 90471 IMMUNIZATION ADMIN: CPT

## 2021-08-04 ENCOUNTER — RX RENEWAL (OUTPATIENT)
Age: 81
End: 2021-08-04

## 2021-08-04 RX ORDER — HYDROCHLOROTHIAZIDE 25 MG/1
25 TABLET ORAL
Qty: 90 | Refills: 3 | Status: ACTIVE | COMMUNITY
Start: 2019-09-24 | End: 1900-01-01

## 2021-08-04 RX ORDER — LOSARTAN POTASSIUM 100 MG/1
100 TABLET, FILM COATED ORAL DAILY
Qty: 90 | Refills: 3 | Status: ACTIVE | COMMUNITY
Start: 2019-09-24 | End: 1900-01-01

## 2021-08-19 ENCOUNTER — APPOINTMENT (OUTPATIENT)
Dept: ORTHOPEDIC SURGERY | Facility: CLINIC | Age: 81
End: 2021-08-19
Payer: MEDICARE

## 2021-08-19 VITALS
WEIGHT: 142 LBS | HEART RATE: 84 BPM | BODY MASS INDEX: 25.16 KG/M2 | DIASTOLIC BLOOD PRESSURE: 69 MMHG | SYSTOLIC BLOOD PRESSURE: 175 MMHG | HEIGHT: 63 IN

## 2021-08-19 DIAGNOSIS — M25.552 PAIN IN LEFT HIP: ICD-10-CM

## 2021-08-19 DIAGNOSIS — M54.30 SCIATICA, UNSPECIFIED SIDE: ICD-10-CM

## 2021-08-19 PROCEDURE — 99213 OFFICE O/P EST LOW 20 MIN: CPT

## 2021-08-19 PROCEDURE — 73502 X-RAY EXAM HIP UNI 2-3 VIEWS: CPT | Mod: LT

## 2021-08-19 NOTE — DISCUSSION/SUMMARY
[de-identified] : The patient has continued left buttock pain which I believe is secondary to sciatica.  Her neurologic status is intact.  Pain is not reproduced by hip motion.  She is started on a course of physical therapy.  She will return in 4 weeks.

## 2021-08-19 NOTE — PHYSICAL EXAM
[DP] : dorsalis pedis 2+ and symmetric bilaterally [PT] : posterior tibial 2+ and symmetric bilaterally [Normal] : Alert and in no acute distress [Poor Appearance] : well-appearing [Acute Distress] : not in acute distress [Obese] : not obese [de-identified] : The patient has no respiratory distress. Mood and affect are normal. The patient is alert and oriented to person, place and time.\par Examination of the lumbar spine demonstrates tenderness to the left of the midline. There is mild muscle spasm. There is no deformity. Lumbar flexion is 90°, right lateral flexion 10° and left lateral flexion 10°. Straight leg raise test is positive on the left at 70 degrees. Lower extremity neurologic exam is intact with regard to sensation, motor function and deep tendon reflexes.\par Trendelenburg is negative.  There is no pain with active or passive motion of the hips.  The calves are soft and nontender.  The skin is intact.  There is no lymphedema. [de-identified] : AP and lateral x-rays of the left hip with x-ray of the pelvis demonstrates no fracture, no dislocation and no bony abnormality.

## 2021-08-19 NOTE — HISTORY OF PRESENT ILLNESS
[de-identified] : Ms. WING BURNETT  is a 81 year old female who presents to the office for a follow-up visit.  She continues to complain of left buttock pain.  She has done a second medrol pack without any relief.  She has difficulty sleeping.  She denies lower extremity numbness or tingling.\par

## 2021-09-17 ENCOUNTER — APPOINTMENT (OUTPATIENT)
Dept: ORTHOPEDIC SURGERY | Facility: CLINIC | Age: 81
End: 2021-09-17

## 2021-10-28 ENCOUNTER — APPOINTMENT (OUTPATIENT)
Dept: ORTHOPEDIC SURGERY | Facility: CLINIC | Age: 81
End: 2021-10-28
Payer: MEDICARE

## 2021-10-28 VITALS — WEIGHT: 124 LBS | HEIGHT: 63 IN | BODY MASS INDEX: 21.97 KG/M2

## 2021-10-28 DIAGNOSIS — M70.62 TROCHANTERIC BURSITIS, LEFT HIP: ICD-10-CM

## 2021-10-28 DIAGNOSIS — M76.02 GLUTEAL TENDINITIS, LEFT HIP: ICD-10-CM

## 2021-10-28 PROCEDURE — 99214 OFFICE O/P EST MOD 30 MIN: CPT

## 2021-10-28 PROCEDURE — 73502 X-RAY EXAM HIP UNI 2-3 VIEWS: CPT | Mod: LT

## 2021-12-20 ENCOUNTER — APPOINTMENT (OUTPATIENT)
Dept: INTERNAL MEDICINE | Facility: CLINIC | Age: 81
End: 2021-12-20

## 2022-02-15 ENCOUNTER — APPOINTMENT (OUTPATIENT)
Dept: ORTHOPEDIC SURGERY | Facility: CLINIC | Age: 82
End: 2022-02-15
Payer: MEDICARE

## 2022-02-15 VITALS
SYSTOLIC BLOOD PRESSURE: 176 MMHG | HEIGHT: 63 IN | BODY MASS INDEX: 21.97 KG/M2 | DIASTOLIC BLOOD PRESSURE: 75 MMHG | HEART RATE: 99 BPM | WEIGHT: 124 LBS

## 2022-02-15 PROCEDURE — 99214 OFFICE O/P EST MOD 30 MIN: CPT

## 2022-02-15 PROCEDURE — 73660 X-RAY EXAM OF TOE(S): CPT | Mod: RT

## 2022-02-15 NOTE — DISCUSSION/SUMMARY
[de-identified] : The patient has inflammation of the right great toe without trauma.  This is likely a flareup of her gout.  I have discussed the pathology and natural history with her.  We will place her on a course of Mobic.  Medication risks have been reviewed.  In addition her left knee has been bothering her.  She has known arthritis.  The Mobic should be effective for her knee as well.  I would like to reevaluate her in 2 weeks.

## 2022-02-15 NOTE — HISTORY OF PRESENT ILLNESS
[de-identified] : This 81-year-old woman presents for evaluation of a 4-day history of pain in her right great toe.  There is no known trauma.  She had some pain and swelling which has been resolving.  2 years ago she was seen with similar symptoms and was diagnosed with gout and was treated with Mobic.  She denies numbness or tingling in her lower extremities.  She also reports that she has been having an increase in her left knee pain.  She has been told in the past that she has left knee arthritis.

## 2022-02-15 NOTE — PHYSICAL EXAM
[Slightly Antalgic] : slightly antalgic [LE] : Sensory: Intact in bilateral lower extremities [DP] : dorsalis pedis 2+ and symmetric bilaterally [PT] : posterior tibial 2+ and symmetric bilaterally [Normal] : Alert and in no acute distress [Poor Appearance] : well-appearing [Acute Distress] : not in acute distress [de-identified] : The patient has no respiratory distress. Mood and affect are normal. The patient is alert and oriented to person, place and time.\par There is tenderness of the right great toe metatarsal phalangeal joint.  There is mild tenderness and erythema.  There is no pain with passive motion of the toe.  The other toes are nontender.  There is no lymphedema.  Examination of the left knee demonstrates medial sided tenderness.  Quadriceps and hamstring function are intact.  Collateral and cruciate ligaments are stable.  Range of motion 0 to 110 degrees of both knees. [de-identified] : AP, lateral and oblique x-rays of the right toes demonstrate no fracture or dislocation.  Previously taken x-rays of the left knee from October 12, 2020 demonstrate osteoarthritis.

## 2022-03-01 ENCOUNTER — APPOINTMENT (OUTPATIENT)
Dept: ORTHOPEDIC SURGERY | Facility: CLINIC | Age: 82
End: 2022-03-01

## 2022-03-31 ENCOUNTER — APPOINTMENT (OUTPATIENT)
Dept: ORTHOPEDIC SURGERY | Facility: CLINIC | Age: 82
End: 2022-03-31
Payer: MEDICARE

## 2022-03-31 VITALS
BODY MASS INDEX: 21.97 KG/M2 | HEIGHT: 63 IN | SYSTOLIC BLOOD PRESSURE: 170 MMHG | HEART RATE: 85 BPM | DIASTOLIC BLOOD PRESSURE: 75 MMHG | WEIGHT: 124 LBS

## 2022-03-31 DIAGNOSIS — M79.674 PAIN IN RIGHT TOE(S): ICD-10-CM

## 2022-03-31 PROCEDURE — 99213 OFFICE O/P EST LOW 20 MIN: CPT | Mod: 25

## 2022-03-31 PROCEDURE — 20610 DRAIN/INJ JOINT/BURSA W/O US: CPT | Mod: LT

## 2022-03-31 NOTE — HISTORY OF PRESENT ILLNESS
[de-identified] : This is a 81 year old female who presents to the office for a follow-up visit.   Her right big toe is better but she continues to have left knee pain in the medial/inferior aspect of the patella.  She does wear open toe shoes so there is less pressure on her toe.

## 2022-03-31 NOTE — DISCUSSION/SUMMARY
[de-identified] : The patient's right foot is now asymptomatic.  She is still experiencing some pain in her left knee.  Treatment options were discussed in detail.  She would like to try a steroid injection for her left knee.\par Informed consent was obtained. Site and procedure were confirmed with the patient. Following a sterile prep the left knee was aspirated but no fluid was returned. 1 cc of Depo-Medrol and 4 cc of 1% Xylocaine was injected in the left knee without complication. Sterile dressing was applied. Instructions were given.\par She will return as needed.

## 2022-03-31 NOTE — PHYSICAL EXAM
[Slightly Antalgic] : slightly antalgic [LE] : Sensory: Intact in bilateral lower extremities [DP] : dorsalis pedis 2+ and symmetric bilaterally [PT] : posterior tibial 2+ and symmetric bilaterally [Normal] : Alert and in no acute distress [Poor Appearance] : well-appearing [Acute Distress] : not in acute distress [de-identified] : The patient has no respiratory distress. Mood and affect are normal. The patient is alert and oriented to person, place and time.\par There is no tenderness or swelling at the right foot.  Toe motion is pain-free.  There is no lymphedema.  Examination of the left knee demonstrates medial sided tenderness.  Quadriceps and hamstring function are intact.  Collateral and cruciate ligaments are stable.  Range of motion 0 to 110 degrees of both knees.

## 2022-07-13 ENCOUNTER — RX RENEWAL (OUTPATIENT)
Age: 82
End: 2022-07-13

## 2022-07-13 RX ORDER — SIMVASTATIN 20 MG/1
20 TABLET, FILM COATED ORAL
Qty: 90 | Refills: 0 | Status: ACTIVE | COMMUNITY
Start: 2017-09-19 | End: 1900-01-01

## 2022-08-25 ENCOUNTER — APPOINTMENT (OUTPATIENT)
Dept: ORTHOPEDIC SURGERY | Facility: CLINIC | Age: 82
End: 2022-08-25

## 2022-08-25 VITALS
HEART RATE: 105 BPM | HEIGHT: 63 IN | SYSTOLIC BLOOD PRESSURE: 165 MMHG | BODY MASS INDEX: 23.92 KG/M2 | DIASTOLIC BLOOD PRESSURE: 75 MMHG | WEIGHT: 135 LBS

## 2022-08-25 PROCEDURE — 99213 OFFICE O/P EST LOW 20 MIN: CPT

## 2022-08-25 PROCEDURE — 73564 X-RAY EXAM KNEE 4 OR MORE: CPT | Mod: LT

## 2022-08-25 RX ORDER — HYLAN G-F 20 16MG/2ML
16 SYRINGE (ML) INTRAARTICULAR
Qty: 1 | Refills: 0 | Status: ACTIVE | COMMUNITY
Start: 2022-08-25

## 2022-08-25 NOTE — HISTORY OF PRESENT ILLNESS
[de-identified] : The patient presents for reevaluation of left knee pain. She complains of intermittent sharp pains worse only at night while sleeping. She notes the knee has been swollen for the past 2 weeks. She has been icing the knee and taking Tylenol with good relief and some improvement of swelling. She received a cortisone injection in her left knee 3/31/22 with good relief for several months.

## 2022-08-25 NOTE — REASON FOR VISIT
[Follow-Up Visit] : a follow-up visit for [Family Member] : family member [FreeTextEntry2] : left knee pain

## 2022-08-25 NOTE — PHYSICAL EXAM
[LE] : Sensory: Intact in bilateral lower extremities [DP] : dorsalis pedis 2+ and symmetric bilaterally [PT] : posterior tibial 2+ and symmetric bilaterally [Normal] : Alert and in no acute distress [Poor Appearance] : well-appearing [Acute Distress] : not in acute distress [Obese] : not obese [de-identified] : The patient has no respiratory distress. Mood and affect are normal. The patient is alert and oriented to person, place and time.\par There is no pain with active or passive motion of the hips.  There is no tenderness of either hip.  Examination of the knees demonstrates medial tenderness of the left knee.  There is mild swelling.  Quadriceps and hamstring function are intact.  There is no instability of collateral or cruciate ligament in either knee.  Range of motion 0 to 100 degrees bilaterally.  The calves are soft and nontender.  The skin is intact.  There is no lymphedema. [de-identified] : AP, lateral, tunnel and sunrise x-rays of the left knee taken today demonstrate moderate degenerative changes.  There are no fractures or dislocations.

## 2022-08-25 NOTE — DISCUSSION/SUMMARY
[de-identified] : The patient has osteoarthritis of the left knee.  I have discussed the pathology, natural history and treatment options with her.  She would like to try a course of viscosupplementation for her left knee.  I have discussed the procedure, risk, benefits and alternatives.  She understands and wishes to proceed.  Treatment will begin once authorization has been obtained.

## 2022-09-12 ENCOUNTER — APPOINTMENT (OUTPATIENT)
Dept: ORTHOPEDIC SURGERY | Facility: CLINIC | Age: 82
End: 2022-09-12

## 2022-09-12 VITALS — WEIGHT: 135 LBS | HEIGHT: 63 IN | BODY MASS INDEX: 23.92 KG/M2

## 2022-09-12 PROCEDURE — 20610 DRAIN/INJ JOINT/BURSA W/O US: CPT | Mod: LT

## 2022-09-12 NOTE — PHYSICAL EXAM
[LE] : Sensory: Intact in bilateral lower extremities [DP] : dorsalis pedis 2+ and symmetric bilaterally [PT] : posterior tibial 2+ and symmetric bilaterally [Normal] : Alert and in no acute distress [Poor Appearance] : well-appearing [Acute Distress] : not in acute distress [Obese] : not obese [de-identified] : The patient has no respiratory distress. Mood and affect are normal. The patient is alert and oriented to person, place and time.\par There is no pain with active or passive motion of the hips.  There is no tenderness of either hip.  Examination of the knees demonstrates medial tenderness of the left knee.  There is mild swelling.  Quadriceps and hamstring function are intact.  There is no instability of collateral or cruciate ligament in either knee.  Range of motion 0 to 100 degrees bilaterally.  The calves are soft and nontender.  The skin is intact.  There is no lymphedema. [de-identified] : AP, lateral, tunnel and sunrise x-rays of the left knee taken 8/25/22 demonstrate moderate degenerative changes.  There are no fractures or dislocations.

## 2022-09-12 NOTE — DISCUSSION/SUMMARY
[de-identified] : Informed consent was obtained. Following sterile prep the left knee was aspirated but no fluid was returned. Synvisc was injected in to the left knee without complication. Sterile dressing was applied. Instructions were given. \par She will return in one week.

## 2022-09-12 NOTE — HISTORY OF PRESENT ILLNESS
[de-identified] : 82 year old female presents to begin Synvisc to the left knee. The procedure, risks and benefits have been discussed with the patient. She understands and wishes to proceed.

## 2022-09-19 ENCOUNTER — APPOINTMENT (OUTPATIENT)
Dept: ORTHOPEDIC SURGERY | Facility: CLINIC | Age: 82
End: 2022-09-19

## 2022-09-19 VITALS — WEIGHT: 135 LBS | BODY MASS INDEX: 23.92 KG/M2 | HEIGHT: 63 IN

## 2022-09-19 PROCEDURE — 20610 DRAIN/INJ JOINT/BURSA W/O US: CPT | Mod: LT

## 2022-09-19 NOTE — DISCUSSION/SUMMARY
[de-identified] : Informed consent was obtained. Following sterile prep the left knee was aspirated but no fluid was returned. Synvisc was injected in to the left knee without complication. Sterile dressing was applied. Instructions were given. \par She will return in one week.

## 2022-09-19 NOTE — PHYSICAL EXAM
[LE] : Sensory: Intact in bilateral lower extremities [DP] : dorsalis pedis 2+ and symmetric bilaterally [PT] : posterior tibial 2+ and symmetric bilaterally [Normal] : Alert and in no acute distress [Poor Appearance] : well-appearing [Acute Distress] : not in acute distress [Obese] : not obese [de-identified] : The patient has no respiratory distress. Mood and affect are normal. The patient is alert and oriented to person, place and time.\par There is no pain with active or passive motion of the hips.  There is no tenderness of either hip.  Examination of the knees demonstrates medial tenderness of the left knee.  There is mild swelling.  Quadriceps and hamstring function are intact.  There is no instability of collateral or cruciate ligament in either knee.  Range of motion 0 to 100 degrees bilaterally.  The calves are soft and nontender.  The skin is intact.  There is no lymphedema. [de-identified] : AP, lateral, tunnel and sunrise x-rays of the left knee taken 8/25/22 demonstrate moderate degenerative changes.  There are no fractures or dislocations.

## 2022-09-19 NOTE — HISTORY OF PRESENT ILLNESS
[de-identified] : 82 year old female presents for her second Synvisc injection to the left knee. No complications or complaints, tolerated last injection well.

## 2022-09-29 ENCOUNTER — APPOINTMENT (OUTPATIENT)
Dept: ORTHOPEDIC SURGERY | Facility: CLINIC | Age: 82
End: 2022-09-29

## 2022-09-29 VITALS — BODY MASS INDEX: 23.92 KG/M2 | WEIGHT: 135 LBS | HEIGHT: 63 IN

## 2022-09-29 PROCEDURE — 20610 DRAIN/INJ JOINT/BURSA W/O US: CPT | Mod: LT

## 2022-09-29 NOTE — DISCUSSION/SUMMARY
[de-identified] : Informed consent was obtained. Following sterile prep the left knee was aspirated but no fluid was returned. Synvisc was injected in to the left knee without complication. Sterile dressing was applied. Instructions were given. \par She will return as needed.

## 2022-09-29 NOTE — HISTORY OF PRESENT ILLNESS
[de-identified] : 82 year old female presents for her third Synvisc injection to the left knee. No complications or complaints, tolerated last two injections well.

## 2022-09-29 NOTE — PHYSICAL EXAM
[LE] : Sensory: Intact in bilateral lower extremities [DP] : dorsalis pedis 2+ and symmetric bilaterally [PT] : posterior tibial 2+ and symmetric bilaterally [Normal] : Alert and in no acute distress [Poor Appearance] : well-appearing [Acute Distress] : not in acute distress [Obese] : not obese [de-identified] : The patient has no respiratory distress. Mood and affect are normal. The patient is alert and oriented to person, place and time.\par There is no pain with active or passive motion of the hips.  There is no tenderness of either hip.  Examination of the knees demonstrates medial tenderness of the left knee.  There is mild swelling.  Quadriceps and hamstring function are intact.  There is no instability of collateral or cruciate ligament in either knee.  Range of motion 0 to 100 degrees bilaterally.  The calves are soft and nontender.  The skin is intact.  There is no lymphedema. [de-identified] : AP, lateral, tunnel and sunrise x-rays of the left knee taken 8/25/22 demonstrate moderate degenerative changes.  There are no fractures or dislocations.

## 2022-10-06 ENCOUNTER — RX RENEWAL (OUTPATIENT)
Age: 82
End: 2022-10-06

## 2023-06-05 ENCOUNTER — APPOINTMENT (OUTPATIENT)
Dept: ORTHOPEDIC SURGERY | Facility: CLINIC | Age: 83
End: 2023-06-05
Payer: MEDICARE

## 2023-06-05 VITALS
TEMPERATURE: 97.4 F | OXYGEN SATURATION: 98 % | HEIGHT: 63 IN | BODY MASS INDEX: 23.92 KG/M2 | HEART RATE: 74 BPM | DIASTOLIC BLOOD PRESSURE: 74 MMHG | SYSTOLIC BLOOD PRESSURE: 181 MMHG | WEIGHT: 135 LBS

## 2023-06-05 PROCEDURE — 99214 OFFICE O/P EST MOD 30 MIN: CPT

## 2023-06-05 PROCEDURE — 73564 X-RAY EXAM KNEE 4 OR MORE: CPT | Mod: LT

## 2023-06-05 NOTE — DISCUSSION/SUMMARY
[de-identified] : The patient has osteoarthritis of the left knee.  She has had an exacerbation and now has a contusion.  I have discussed the pathology, natural history and treatment options with her.  She will take Tylenol for pain.  She will bear weight to tolerance.  She already uses a cane periodically.  She will return as needed.

## 2023-06-05 NOTE — HISTORY OF PRESENT ILLNESS
[de-identified] : The patient presents for reevaluation of left knee pain. She took a fall on to the left knee last month. She reports she had some abrasions over the anterior knee. She has been having worsening pain at nighttime and prolonged walking. She takes Tylenol as needed with some relief.

## 2023-08-13 ENCOUNTER — EMERGENCY (EMERGENCY)
Facility: HOSPITAL | Age: 83
LOS: 1 days | Discharge: HOME | End: 2023-08-13
Attending: EMERGENCY MEDICINE | Admitting: EMERGENCY MEDICINE
Payer: COMMERCIAL

## 2023-08-13 VITALS
SYSTOLIC BLOOD PRESSURE: 160 MMHG | DIASTOLIC BLOOD PRESSURE: 74 MMHG | OXYGEN SATURATION: 98 % | HEART RATE: 70 BPM | RESPIRATION RATE: 17 BRPM

## 2023-08-13 VITALS
TEMPERATURE: 98 F | HEART RATE: 88 BPM | DIASTOLIC BLOOD PRESSURE: 80 MMHG | SYSTOLIC BLOOD PRESSURE: 176 MMHG | RESPIRATION RATE: 18 BRPM | OXYGEN SATURATION: 98 %

## 2023-08-13 PROCEDURE — 71045 X-RAY EXAM CHEST 1 VIEW: CPT | Mod: 26

## 2023-08-13 PROCEDURE — 73562 X-RAY EXAM OF KNEE 3: CPT | Mod: 26,50

## 2023-08-13 PROCEDURE — 93010 ELECTROCARDIOGRAM REPORT: CPT

## 2023-08-13 PROCEDURE — 99284 EMERGENCY DEPT VISIT MOD MDM: CPT

## 2023-08-13 RX ORDER — ACETAMINOPHEN 500 MG
650 TABLET ORAL ONCE
Refills: 0 | Status: COMPLETED | OUTPATIENT
Start: 2023-08-13 | End: 2023-08-13

## 2023-08-13 NOTE — ED PROVIDER NOTE - PATIENT PORTAL LINK FT
You can access the FollowMyHealth Patient Portal offered by [facility] by registering at the following website: [email or web address]. By joining [facility]’s FollowMyHealth portal, you will also be able to view your health information using other applications (apps) compatible with our system.

## 2023-08-13 NOTE — ED ADULT NURSE NOTE - NSFALLCONCLUSION_ED_ALL_ED
13. Impression:  56 yo M w/ no pmhx presents from Urgent Care w/ LUE numbness. MR Brain: Subacute appearing infarct in the right parietotemporal region. CTA H&N: Focal nonocclusive thrombus of the right inferior division M2 segment of the MCA with reconstitution distally. Homocysteine 15 when repeated was 10.       14. Probable cause/s of Stroke:  Unknown at this time embolic cause should be ruled out.     15. Suggestions:   Continue ASA/Plavix and statin.   Follow up hypercoagulable workup.   ISRAEL, if negative would suggest loop recorder.  Serum B12, MMA levels.      56 yo M w/ no pmhx presents from Urgent Care w/ LUE numbness that started the morning of presentation. Denies focal weakness, dysarthria, aphasia, visual or other sensory defects  ED: Code stroke called. MR Brain (+) for Rt. Parietotemporal Subacute infarct. pt was Out of tPA window & not an emergent neuroendovascular intervention candidate.   Admit to Stroke Unit for Subacute right parietotemporal infarct and Acute right M2 occlusion     #Subacute right parietotemporal infarct   #Acute right M2 occlusion   - Seen by Neurology, no tPA given out of tPA window & not an emergent neuroendovascular intervention candidate given low NIHSS & nondisabling deficit  - Loaded w/ 325mg Aspirin this started on Aspirin 81mg Daily & Lipitor 80mg Daily;  - MR Brain w/ ANA:  Focal acute infarcts in a right MCA distribution without evidence transformation. No abnormal intracranial enhancement.  -  TTE noted  - ISRAEL  - f/u Lipid profile, A1c, Coags  - f/u  hypercoagulable panel  - f/u PT/OT    Diet: DASH  Activity: AAT  DVT ppx: Lovenox 40mg subq Daily  GI ppx: n/a    Dispo: Acute (from home)             58 yo M w/ no pmhx presents from Urgent Care w/ LUE numbness that started the morning of presentation. Denies focal weakness, dysarthria, aphasia, visual or other sensory defects  ED: Code stroke called. MR Brain (+) for Rt. Parietotemporal Subacute infarct. pt was Out of tPA window & not an emergent neuroendovascular intervention candidate.   Admit to Stroke Unit for Subacute right parietotemporal infarct and Acute right M2 occlusion     # Subacute right parietotemporal infarct   #Acute right M2 occlusion   - No risk factors for stroke noted except for 2nd Moderna COVID Vaccine shot ~10days ago;  - Fine touch sensation impaired from hand to mid-forearm;    - Seen by Neurology, no tPA given out of tPA window & not an emergent neuroendovascular intervention candidate given low NIHSS & nondisabling deficit  - Bedside Dysphagia screen: Negative for aspiration; Able to tolerate eating and drinking; No need for S&S assessment;  - Loaded w/ 325mg Aspirin this started on Aspirin 81mg Daily & Lipitor 80mg Daily;  - Neuro check q4  -  TTE/ISRAEL  - f/u MR Brain w/ ANA & TTE  - f/u Lipid profile, A1c, Coags  - f/u  hypercoagulable panel  - f/u PT/OT    Diet: DASH  Activity: AAT  DVT ppx: Lovenox 40mg subq Daily  GI ppx: n/a    Dispo: Acute (from home)               IMPRESSION:  - likely vasovagal episode  - CVA      PLAN:    CNS: Avoid over sedation    HEENT: Oral care    PULMONARY:  HOB @ 45 degrees    CARDIOVASCULAR:  - tte:  ef 55-60. trace mr  - meds:  cw asa, plavix, statin  - plan:   - can dc home later today and follow up cardio / ep outpt for lacie / loop.   - f/u ep today         GI: GI prophylaxis.  Feeding     RENAL:  Follow up lytes.  Correct as needed    INFECTIOUS DISEASE: Follow up cultures    HEMATOLOGICAL:  DVT prophylaxis.    ENDOCRINE:  Follow up FS.  Insulin protocol if needed.    MUSCULOSKELETAL: oobtc      DISPO:  dc home today         Universal Safety Interventions
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
## 2023-08-18 ENCOUNTER — APPOINTMENT (OUTPATIENT)
Dept: ORTHOPEDIC SURGERY | Facility: CLINIC | Age: 83
End: 2023-08-18
Payer: COMMERCIAL

## 2023-08-18 VITALS
HEART RATE: 91 BPM | DIASTOLIC BLOOD PRESSURE: 88 MMHG | SYSTOLIC BLOOD PRESSURE: 177 MMHG | WEIGHT: 140 LBS | HEIGHT: 62 IN | OXYGEN SATURATION: 98 % | BODY MASS INDEX: 25.76 KG/M2

## 2023-08-18 DIAGNOSIS — S80.02XA CONTUSION OF LEFT KNEE, INITIAL ENCOUNTER: ICD-10-CM

## 2023-08-18 PROCEDURE — 99213 OFFICE O/P EST LOW 20 MIN: CPT
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
## 2023-08-29 ENCOUNTER — APPOINTMENT (OUTPATIENT)
Dept: GERIATRICS | Facility: CLINIC | Age: 83
End: 2023-08-29
Payer: MEDICARE

## 2023-08-29 VITALS
DIASTOLIC BLOOD PRESSURE: 80 MMHG | OXYGEN SATURATION: 97 % | TEMPERATURE: 97.9 F | HEIGHT: 62 IN | WEIGHT: 135.38 LBS | BODY MASS INDEX: 24.91 KG/M2 | HEART RATE: 73 BPM | RESPIRATION RATE: 16 BRPM | SYSTOLIC BLOOD PRESSURE: 189 MMHG

## 2023-08-29 DIAGNOSIS — Z00.00 ENCOUNTER FOR GENERAL ADULT MEDICAL EXAMINATION W/OUT ABNORMAL FINDINGS: ICD-10-CM

## 2023-08-29 DIAGNOSIS — I10 ESSENTIAL (PRIMARY) HYPERTENSION: ICD-10-CM

## 2023-08-29 PROCEDURE — 99204 OFFICE O/P NEW MOD 45 MIN: CPT | Mod: GC

## 2023-08-29 RX ORDER — CELECOXIB 100 MG/1
100 CAPSULE ORAL TWICE DAILY
Qty: 10 | Refills: 0 | Status: DISCONTINUED | COMMUNITY
Start: 2021-10-28 | End: 2023-08-29

## 2023-08-29 RX ORDER — MELOXICAM 15 MG/1
15 TABLET ORAL DAILY
Qty: 1 | Refills: 0 | Status: DISCONTINUED | COMMUNITY
Start: 2022-02-15 | End: 2023-08-29

## 2023-08-29 RX ORDER — METHYLPREDNISOLONE 4 MG/1
4 TABLET ORAL
Qty: 1 | Refills: 0 | Status: DISCONTINUED | COMMUNITY
Start: 2021-06-24 | End: 2023-08-29

## 2023-08-29 RX ORDER — METHYLPREDNISOLONE 4 MG/1
4 TABLET ORAL
Qty: 21 | Refills: 0 | Status: DISCONTINUED | COMMUNITY
Start: 2022-02-15 | End: 2023-08-29

## 2023-08-29 RX ORDER — OLOPATADINE HCL 1 MG/ML
0.1 SOLUTION/ DROPS OPHTHALMIC TWICE DAILY
Qty: 1 | Refills: 3 | Status: DISCONTINUED | COMMUNITY
Start: 2019-04-05 | End: 2023-08-29

## 2023-08-29 RX ORDER — AMLODIPINE BESYLATE 5 MG/1
5 TABLET ORAL
Qty: 90 | Refills: 3 | Status: DISCONTINUED | COMMUNITY
Start: 2019-01-08 | End: 2023-08-29

## 2023-08-30 LAB
ALBUMIN SERPL ELPH-MCNC: 4.8 G/DL
ALP BLD-CCNC: 111 U/L
ALT SERPL-CCNC: 14 U/L
ANION GAP SERPL CALC-SCNC: 13 MMOL/L
AST SERPL-CCNC: 17 U/L
BILIRUB SERPL-MCNC: 0.6 MG/DL
BUN SERPL-MCNC: 37 MG/DL
CALCIUM SERPL-MCNC: 10.1 MG/DL
CHLORIDE SERPL-SCNC: 100 MMOL/L
CHOLEST SERPL-MCNC: 160 MG/DL
CO2 SERPL-SCNC: 24 MMOL/L
CREAT SERPL-MCNC: 1.14 MG/DL
EGFR: 48 ML/MIN/1.73M2
ESTIMATED AVERAGE GLUCOSE: 105 MG/DL
GLUCOSE SERPL-MCNC: 98 MG/DL
HBA1C MFR BLD HPLC: 5.3 %
HCT VFR BLD CALC: 37.5 %
HDLC SERPL-MCNC: 40 MG/DL
HGB BLD-MCNC: 12.5 G/DL
LDLC SERPL CALC-MCNC: 68 MG/DL
MCHC RBC-ENTMCNC: 30 PG
MCHC RBC-ENTMCNC: 33.3 GM/DL
MCV RBC AUTO: 90.1 FL
NONHDLC SERPL-MCNC: 120 MG/DL
PLATELET # BLD AUTO: 245 K/UL
POTASSIUM SERPL-SCNC: 3.8 MMOL/L
PROT SERPL-MCNC: 7.6 G/DL
RBC # BLD: 4.16 M/UL
RBC # FLD: 12.9 %
SODIUM SERPL-SCNC: 138 MMOL/L
TRIGL SERPL-MCNC: 334 MG/DL
TSH SERPL-ACNC: 1.14 UIU/ML
VIT B12 SERPL-MCNC: >2000 PG/ML
WBC # FLD AUTO: 10.65 K/UL

## 2023-08-30 NOTE — REVIEW OF SYSTEMS
[As Noted in HPI] : as noted in HPI [Anxiety] : anxiety [Negative] : Heme/Lymph [FreeTextEntry9] : Lt. knee pain
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
## 2023-08-30 NOTE — REASON FOR VISIT
[Family Member] : family member [FreeTextEntry1] : Establish care with new [facility]primary doctor. Cognitive decline and memory concern by family [FreeTextEntry3] : Karen
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
## 2023-08-30 NOTE — ASSESSMENT
[FreeTextEntry1] : Prevnar 20  and flu shot next visit Lab reviewed;  HDL 40. Advise to lower carb.                         GFR 48 . Avoid nephrotoxic med. Control BP. Low salt diet.                         CBC: increased WBC: repeat CBC with diff with next blood test  RTC 2 month

## 2023-08-30 NOTE — SOCIAL HISTORY
[Alone] : lives alone [FreeTextEntry1] : daughter Chelsea lives upstairs.  [FreeTextEntry3] : Lives alone

## 2023-08-30 NOTE — PHYSICAL EXAM
[Alert] : alert [No Acute Distress] : in no acute distress [Normal Appearance] : the appearance of the neck was normal [Supple] : the neck was supple [No Respiratory Distress] : no respiratory distress [No Acc Muscle Use] : no accessory muscle use [Respiration, Rhythm And Depth] : normal respiratory rhythm and effort [Auscultation Breath Sounds / Voice Sounds] : lungs were clear to auscultation bilaterally [Heart Rate And Rhythm] : heart rate was normal and rhythm regular [Bowel Sounds] : normal bowel sounds [Abdomen Tenderness] : non-tender [Abdomen Soft] : soft [No Spinal Tenderness] : no spinal tenderness [Normal Color / Pigmentation] : normal skin color and pigmentation [Normal Turgor] : normal skin turgor [No Focal Deficits] : no focal deficits [Normal Affect] : the affect was normal [Normal Mood] : the mood was normal [de-identified] : Rt. ear ear wax.

## 2023-09-10 NOTE — PHYSICAL EXAM
[Slightly Antalgic] : slightly antalgic [Cane] : ambulates with cane [LE] : Sensory: Intact in bilateral lower extremities [DP] : dorsalis pedis 2+ and symmetric bilaterally [PT] : posterior tibial 2+ and symmetric bilaterally [Normal] : Alert and in no acute distress [Poor Appearance] : well-appearing [Acute Distress] : not in acute distress [Obese] : not obese [de-identified] : The patient has no respiratory distress. Mood and affect are normal. The patient is alert and oriented to person, place and time.\par There is no pain with active or passive motion of the hips.  There is no tenderness of either hip.  Examination of the left knee demonstrates anterior tenderness.  Quadriceps and hamstring function are intact.  There is no instability of collateral or cruciate ligaments.  Range of motion 0 to 100 degrees.  The calves are soft and nontender.  The skin is intact.  There is no lymphedema. [de-identified] : AP, lateral, tunnel and sunrise x-rays of the left knee taken today demonstrate no fracture or dislocation.  She has moderate degenerative tricompartmental arthritis.  These x-rays are compared to x-rays from August 25, 2022 there is no significant difference. Bed/Stretcher in lowest position, wheels locked, appropriate side rails in place/Call bell, personal items and telephone in reach/Instruct patient to call for assistance before getting out of bed/chair/stretcher/Non-slip footwear applied when patient is off stretcher/[place] to call system/Physically safe environment - no spills, clutter or unnecessary equipment/Purposeful proactive rounding/Room/bathroom lighting operational, light cord in reach

## 2023-10-10 ENCOUNTER — APPOINTMENT (OUTPATIENT)
Dept: ORTHOPEDIC SURGERY | Facility: CLINIC | Age: 83
End: 2023-10-10
Payer: COMMERCIAL

## 2023-10-10 VITALS
DIASTOLIC BLOOD PRESSURE: 72 MMHG | SYSTOLIC BLOOD PRESSURE: 193 MMHG | HEART RATE: 76 BPM | BODY MASS INDEX: 24.84 KG/M2 | HEIGHT: 62 IN | WEIGHT: 135 LBS

## 2023-10-10 PROCEDURE — 20610 DRAIN/INJ JOINT/BURSA W/O US: CPT | Mod: LT

## 2023-10-10 PROCEDURE — 99213 OFFICE O/P EST LOW 20 MIN: CPT | Mod: 25

## 2023-10-17 ENCOUNTER — APPOINTMENT (OUTPATIENT)
Dept: GERIATRICS | Facility: CLINIC | Age: 83
End: 2023-10-17
Payer: MEDICARE

## 2023-10-17 VITALS
OXYGEN SATURATION: 98 % | WEIGHT: 135 LBS | RESPIRATION RATE: 15 BRPM | TEMPERATURE: 97.3 F | BODY MASS INDEX: 24.69 KG/M2

## 2023-10-17 VITALS — SYSTOLIC BLOOD PRESSURE: 204 MMHG | DIASTOLIC BLOOD PRESSURE: 80 MMHG | HEART RATE: 71 BPM

## 2023-10-17 DIAGNOSIS — I10 ESSENTIAL (PRIMARY) HYPERTENSION: ICD-10-CM

## 2023-10-17 DIAGNOSIS — R41.89 OTHER SYMPTOMS AND SIGNS INVOLVING COGNITIVE FUNCTIONS AND AWARENESS: ICD-10-CM

## 2023-10-17 DIAGNOSIS — E78.00 PURE HYPERCHOLESTEROLEMIA, UNSPECIFIED: ICD-10-CM

## 2023-10-17 DIAGNOSIS — S89.92XA UNSPECIFIED INJURY OF LEFT LOWER LEG, INITIAL ENCOUNTER: ICD-10-CM

## 2023-10-17 PROCEDURE — 99215 OFFICE O/P EST HI 40 MIN: CPT | Mod: GC

## 2023-10-19 ENCOUNTER — TRANSCRIPTION ENCOUNTER (OUTPATIENT)
Age: 83
End: 2023-10-19

## 2023-10-24 RX ORDER — HYLAN G-F 20 16MG/2ML
16 SYRINGE (ML) INTRAARTICULAR
Qty: 1 | Refills: 0 | Status: ACTIVE | COMMUNITY
Start: 2023-10-24

## 2023-11-21 ENCOUNTER — APPOINTMENT (OUTPATIENT)
Dept: ORTHOPEDIC SURGERY | Facility: CLINIC | Age: 83
End: 2023-11-21
Payer: COMMERCIAL

## 2023-11-21 VITALS
SYSTOLIC BLOOD PRESSURE: 174 MMHG | DIASTOLIC BLOOD PRESSURE: 74 MMHG | WEIGHT: 135 LBS | HEIGHT: 62 IN | HEART RATE: 76 BPM | BODY MASS INDEX: 24.84 KG/M2

## 2023-11-21 PROCEDURE — 20610 DRAIN/INJ JOINT/BURSA W/O US: CPT | Mod: LT

## 2023-11-28 ENCOUNTER — APPOINTMENT (OUTPATIENT)
Dept: ORTHOPEDIC SURGERY | Facility: CLINIC | Age: 83
End: 2023-11-28
Payer: COMMERCIAL

## 2023-11-28 VITALS
HEIGHT: 62 IN | SYSTOLIC BLOOD PRESSURE: 175 MMHG | HEART RATE: 89 BPM | WEIGHT: 135 LBS | BODY MASS INDEX: 24.84 KG/M2 | DIASTOLIC BLOOD PRESSURE: 66 MMHG

## 2023-11-28 PROCEDURE — 20610 DRAIN/INJ JOINT/BURSA W/O US: CPT | Mod: LT

## 2023-12-07 ENCOUNTER — APPOINTMENT (OUTPATIENT)
Dept: ORTHOPEDIC SURGERY | Facility: CLINIC | Age: 83
End: 2023-12-07
Payer: COMMERCIAL

## 2023-12-07 VITALS
HEIGHT: 62 IN | DIASTOLIC BLOOD PRESSURE: 73 MMHG | SYSTOLIC BLOOD PRESSURE: 173 MMHG | WEIGHT: 135 LBS | HEART RATE: 82 BPM | BODY MASS INDEX: 24.84 KG/M2

## 2023-12-07 PROCEDURE — 20610 DRAIN/INJ JOINT/BURSA W/O US: CPT | Mod: LT

## 2023-12-29 ENCOUNTER — APPOINTMENT (OUTPATIENT)
Dept: OPHTHALMOLOGY | Facility: CLINIC | Age: 83
End: 2023-12-29
Payer: MEDICARE

## 2023-12-29 ENCOUNTER — NON-APPOINTMENT (OUTPATIENT)
Age: 83
End: 2023-12-29

## 2023-12-29 PROCEDURE — 92134 CPTRZ OPH DX IMG PST SGM RTA: CPT

## 2023-12-29 PROCEDURE — 92004 COMPRE OPH EXAM NEW PT 1/>: CPT

## 2024-04-25 ENCOUNTER — APPOINTMENT (OUTPATIENT)
Dept: ORTHOPEDIC SURGERY | Facility: CLINIC | Age: 84
End: 2024-04-25
Payer: MEDICARE

## 2024-04-25 VITALS
BODY MASS INDEX: 25.21 KG/M2 | WEIGHT: 137 LBS | HEIGHT: 62 IN | SYSTOLIC BLOOD PRESSURE: 200 MMHG | HEART RATE: 81 BPM | DIASTOLIC BLOOD PRESSURE: 80 MMHG

## 2024-04-25 DIAGNOSIS — M17.12 UNILATERAL PRIMARY OSTEOARTHRITIS, LEFT KNEE: ICD-10-CM

## 2024-04-25 PROCEDURE — 99214 OFFICE O/P EST MOD 30 MIN: CPT | Mod: 25

## 2024-04-25 PROCEDURE — 20610 DRAIN/INJ JOINT/BURSA W/O US: CPT | Mod: LT

## 2024-04-25 NOTE — HISTORY OF PRESENT ILLNESS
[de-identified] : The patient presents for reevaluation of left knee OA. She had Synvisc injections to the left knee last 12/7/23 with some relief. She has been walking more frequently and feels pain after prolonged periods. She has tried some topical pain relievers without much relief. She is interested in pursuing a HEP and trying a cortisone injection for the left knee.

## 2024-04-25 NOTE — PHYSICAL EXAM
[Slightly Antalgic] : slightly antalgic [LE] : Sensory: Intact in bilateral lower extremities [DP] : dorsalis pedis 2+ and symmetric bilaterally [PT] : posterior tibial 2+ and symmetric bilaterally [Normal] : Alert and in no acute distress [Poor Appearance] : well-appearing [Acute Distress] : not in acute distress [Obese] : not obese [de-identified] : The patient has no respiratory distress. Mood and affect are normal. The patient is alert and oriented to person, place and time. There is no pain with active or passive motion of the hips.  There is no tenderness of either hip.  Examination of the knees demonstrates anteromedial tenderness of the left knee.  Quadriceps and hamstring function are intact.  There is no instability of collateral or cruciate ligaments.  Range of motion 0 to 110 degrees.  Calves are soft and nontender.  The skin is intact.  There is no lymphedema.

## 2024-04-25 NOTE — DISCUSSION/SUMMARY
[de-identified] : The patient had some initial relief from hyaluronic acid injection in her left knee.  Over the last few weeks the pain has been increasing.  Treatment options for osteoarthritis were discussed.  She would like to try corticosteroid injection today. Informed consent was obtained. Site and procedure were confirmed with the patient. Following a sterile prep the left knee was aspirated but no fluid was returned. 1 cc of 40 mg/cc Depo-Medrol and 4 cc of 1% Xylocaine was injected in the left knee without complication. Sterile dressing was applied. Instructions were given. She will work on a home exercise program.  She will apply topical ointments.  If symptoms worsen she will consider hyaluronic acid in June.

## 2024-06-14 ENCOUNTER — APPOINTMENT (OUTPATIENT)
Dept: OPHTHALMOLOGY | Facility: CLINIC | Age: 84
End: 2024-06-14
Payer: MEDICARE

## 2024-06-14 ENCOUNTER — NON-APPOINTMENT (OUTPATIENT)
Age: 84
End: 2024-06-14

## 2024-06-14 PROCEDURE — 92136 OPHTHALMIC BIOMETRY: CPT

## 2024-06-14 PROCEDURE — 92012 INTRM OPH EXAM EST PATIENT: CPT

## 2024-10-14 ENCOUNTER — APPOINTMENT (OUTPATIENT)
Dept: ORTHOPEDIC SURGERY | Facility: CLINIC | Age: 84
End: 2024-10-14
Payer: MEDICARE

## 2024-10-14 VITALS
WEIGHT: 140 LBS | HEART RATE: 83 BPM | HEIGHT: 63 IN | BODY MASS INDEX: 24.8 KG/M2 | SYSTOLIC BLOOD PRESSURE: 199 MMHG | DIASTOLIC BLOOD PRESSURE: 80 MMHG

## 2024-10-14 DIAGNOSIS — M17.12 UNILATERAL PRIMARY OSTEOARTHRITIS, LEFT KNEE: ICD-10-CM

## 2024-10-14 PROCEDURE — 20610 DRAIN/INJ JOINT/BURSA W/O US: CPT | Mod: LT

## 2024-10-14 PROCEDURE — 99214 OFFICE O/P EST MOD 30 MIN: CPT | Mod: 25

## 2024-10-31 ENCOUNTER — APPOINTMENT (OUTPATIENT)
Dept: ORTHOPEDIC SURGERY | Facility: CLINIC | Age: 84
End: 2024-10-31

## 2024-11-12 RX ORDER — HYLAN G-F 20 16MG/2ML
16 SYRINGE (ML) INTRAARTICULAR
Qty: 1 | Refills: 0 | Status: ACTIVE | COMMUNITY
Start: 2024-11-12

## 2024-12-03 ENCOUNTER — APPOINTMENT (OUTPATIENT)
Dept: ORTHOPEDIC SURGERY | Facility: CLINIC | Age: 84
End: 2024-12-03
Payer: MEDICARE

## 2024-12-03 PROCEDURE — 20610 DRAIN/INJ JOINT/BURSA W/O US: CPT | Mod: LT

## 2024-12-10 ENCOUNTER — APPOINTMENT (OUTPATIENT)
Dept: ORTHOPEDIC SURGERY | Facility: CLINIC | Age: 84
End: 2024-12-10
Payer: MEDICARE

## 2024-12-10 VITALS — HEIGHT: 63 IN | WEIGHT: 140 LBS | BODY MASS INDEX: 24.8 KG/M2

## 2024-12-10 PROCEDURE — 20610 DRAIN/INJ JOINT/BURSA W/O US: CPT | Mod: LT

## 2024-12-13 ENCOUNTER — NON-APPOINTMENT (OUTPATIENT)
Age: 84
End: 2024-12-13

## 2024-12-13 ENCOUNTER — APPOINTMENT (OUTPATIENT)
Dept: OPHTHALMOLOGY | Facility: CLINIC | Age: 84
End: 2024-12-13
Payer: MEDICARE

## 2024-12-13 PROCEDURE — 92134 CPTRZ OPH DX IMG PST SGM RTA: CPT

## 2024-12-13 PROCEDURE — 92014 COMPRE OPH EXAM EST PT 1/>: CPT

## 2024-12-17 ENCOUNTER — APPOINTMENT (OUTPATIENT)
Dept: ORTHOPEDIC SURGERY | Facility: CLINIC | Age: 84
End: 2024-12-17
Payer: MEDICARE

## 2024-12-17 VITALS
DIASTOLIC BLOOD PRESSURE: 79 MMHG | WEIGHT: 140 LBS | SYSTOLIC BLOOD PRESSURE: 191 MMHG | HEART RATE: 73 BPM | BODY MASS INDEX: 24.8 KG/M2 | HEIGHT: 63 IN

## 2024-12-17 DIAGNOSIS — M17.12 UNILATERAL PRIMARY OSTEOARTHRITIS, LEFT KNEE: ICD-10-CM

## 2024-12-17 PROCEDURE — 20610 DRAIN/INJ JOINT/BURSA W/O US: CPT | Mod: LT

## 2024-12-20 ENCOUNTER — APPOINTMENT (OUTPATIENT)
Dept: ORTHOPEDIC SURGERY | Facility: CLINIC | Age: 84
End: 2024-12-20
Payer: MEDICARE

## 2024-12-20 VITALS — BODY MASS INDEX: 24.8 KG/M2 | HEIGHT: 63 IN | WEIGHT: 140 LBS

## 2024-12-20 DIAGNOSIS — M75.32 CALCIFIC TENDINITIS OF LEFT SHOULDER: ICD-10-CM

## 2024-12-20 PROCEDURE — 20610 DRAIN/INJ JOINT/BURSA W/O US: CPT | Mod: LT

## 2024-12-20 PROCEDURE — 73030 X-RAY EXAM OF SHOULDER: CPT | Mod: LT

## 2024-12-20 PROCEDURE — 99214 OFFICE O/P EST MOD 30 MIN: CPT | Mod: 25

## 2025-04-25 ENCOUNTER — NON-APPOINTMENT (OUTPATIENT)
Age: 85
End: 2025-04-25

## 2025-04-25 ENCOUNTER — APPOINTMENT (OUTPATIENT)
Dept: OPHTHALMOLOGY | Facility: CLINIC | Age: 85
End: 2025-04-25
Payer: MEDICARE

## 2025-04-25 PROCEDURE — 92012 INTRM OPH EXAM EST PATIENT: CPT

## 2025-04-25 PROCEDURE — 92136 OPHTHALMIC BIOMETRY: CPT

## 2025-07-08 ENCOUNTER — APPOINTMENT (OUTPATIENT)
Dept: ORTHOPEDIC SURGERY | Facility: CLINIC | Age: 85
End: 2025-07-08
Payer: MEDICARE

## 2025-07-08 VITALS — BODY MASS INDEX: 24.8 KG/M2 | HEIGHT: 63 IN | WEIGHT: 140 LBS

## 2025-07-08 PROCEDURE — 99215 OFFICE O/P EST HI 40 MIN: CPT

## 2025-07-08 PROCEDURE — 73562 X-RAY EXAM OF KNEE 3: CPT | Mod: LT
